# Patient Record
Sex: FEMALE | Race: WHITE | NOT HISPANIC OR LATINO | URBAN - METROPOLITAN AREA
[De-identification: names, ages, dates, MRNs, and addresses within clinical notes are randomized per-mention and may not be internally consistent; named-entity substitution may affect disease eponyms.]

---

## 2018-08-29 ENCOUNTER — INPATIENT (INPATIENT)
Facility: HOSPITAL | Age: 83
LOS: 7 days | Discharge: NURSING, REHAB OR LONG-TERM CARE | DRG: 871 | End: 2018-09-06
Attending: STUDENT IN AN ORGANIZED HEALTH CARE EDUCATION/TRAINING PROGRAM | Admitting: INTERNAL MEDICINE
Payer: MEDICARE

## 2018-08-29 VITALS
HEART RATE: 88 BPM | DIASTOLIC BLOOD PRESSURE: 99 MMHG | TEMPERATURE: 99 F | WEIGHT: 179.9 LBS | RESPIRATION RATE: 16 BRPM | SYSTOLIC BLOOD PRESSURE: 171 MMHG | OXYGEN SATURATION: 94 %

## 2018-08-29 DIAGNOSIS — E11.01 TYPE 2 DIABETES MELLITUS WITH HYPEROSMOLARITY WITH COMA: ICD-10-CM

## 2018-08-29 DIAGNOSIS — N39.0 URINARY TRACT INFECTION, SITE NOT SPECIFIED: ICD-10-CM

## 2018-08-29 LAB
ACETONE SERPL-MCNC: NEGATIVE — SIGNIFICANT CHANGE UP
ALBUMIN SERPL ELPH-MCNC: 3.5 G/DL — SIGNIFICANT CHANGE UP (ref 3.3–5)
ALP SERPL-CCNC: 87 U/L — SIGNIFICANT CHANGE UP (ref 40–120)
ALT FLD-CCNC: 31 U/L DA — SIGNIFICANT CHANGE UP (ref 10–45)
ANION GAP SERPL CALC-SCNC: 19 MMOL/L — HIGH (ref 5–17)
APPEARANCE UR: CLEAR — SIGNIFICANT CHANGE UP
APTT BLD: 28 SEC — SIGNIFICANT CHANGE UP (ref 27.5–37.4)
AST SERPL-CCNC: 31 U/L — SIGNIFICANT CHANGE UP (ref 10–40)
BACTERIA # UR AUTO: ABNORMAL /HPF
BILIRUB SERPL-MCNC: 1.1 MG/DL — SIGNIFICANT CHANGE UP (ref 0.2–1.2)
BILIRUB UR-MCNC: NEGATIVE — SIGNIFICANT CHANGE UP
BLD GP AB SCN SERPL QL: SIGNIFICANT CHANGE UP
BLOOD GAS COMMENTS ARTERIAL: SIGNIFICANT CHANGE UP
BLOOD GAS COMMENTS ARTERIAL: SIGNIFICANT CHANGE UP
BUN SERPL-MCNC: 31 MG/DL — HIGH (ref 7–23)
CALCIUM SERPL-MCNC: 10.7 MG/DL — HIGH (ref 8.4–10.5)
CHLORIDE SERPL-SCNC: 95 MMOL/L — LOW (ref 96–108)
CO2 BLDA-SCNC: 19 MMOL/L — LOW (ref 22–30)
CO2 SERPL-SCNC: 19 MMOL/L — LOW (ref 22–31)
COLOR SPEC: YELLOW — SIGNIFICANT CHANGE UP
CREAT SERPL-MCNC: 1.62 MG/DL — HIGH (ref 0.5–1.3)
DIFF PNL FLD: ABNORMAL
EPI CELLS # UR: SIGNIFICANT CHANGE UP
GLUCOSE BLDC GLUCOMTR-MCNC: >600 MG/DL — CRITICAL HIGH (ref 70–99)
GLUCOSE SERPL-MCNC: 1124 MG/DL — CRITICAL HIGH (ref 70–99)
GLUCOSE UR QL: 1000 MG/DL
HCT VFR BLD CALC: 44.9 % — SIGNIFICANT CHANGE UP (ref 34.5–45)
HGB BLD-MCNC: 14.4 G/DL — SIGNIFICANT CHANGE UP (ref 11.5–15.5)
HOROWITZ INDEX BLDA+IHG-RTO: SIGNIFICANT CHANGE UP
INR BLD: 1.01 RATIO — SIGNIFICANT CHANGE UP (ref 0.88–1.16)
KETONES UR-MCNC: ABNORMAL
LACTATE SERPL-SCNC: 7.4 MMOL/L — CRITICAL HIGH (ref 0.7–2)
LACTATE SERPL-SCNC: 7.8 MMOL/L — CRITICAL HIGH (ref 0.7–2)
LEUKOCYTE ESTERASE UR-ACNC: ABNORMAL
LYMPHOCYTES # BLD AUTO: 11 % — LOW (ref 13–44)
MCHC RBC-ENTMCNC: 28.5 PG — SIGNIFICANT CHANGE UP (ref 27–34)
MCHC RBC-ENTMCNC: 32.1 GM/DL — SIGNIFICANT CHANGE UP (ref 32–36)
MCV RBC AUTO: 88.8 FL — SIGNIFICANT CHANGE UP (ref 80–100)
MONOCYTES NFR BLD AUTO: 3 % — SIGNIFICANT CHANGE UP (ref 2–14)
NEUTROPHILS NFR BLD AUTO: 86 % — HIGH (ref 43–77)
NITRITE UR-MCNC: POSITIVE
PCO2 BLDA: 36 MMHG — SIGNIFICANT CHANGE UP (ref 32–46)
PH BLDA: 7.33 — LOW (ref 7.35–7.45)
PH UR: 5 — SIGNIFICANT CHANGE UP (ref 5–8)
PLAT MORPH BLD: NORMAL — SIGNIFICANT CHANGE UP
PLATELET # BLD AUTO: 247 K/UL — SIGNIFICANT CHANGE UP (ref 150–400)
PO2 BLDA: 75 MMHG — SIGNIFICANT CHANGE UP (ref 74–108)
POTASSIUM SERPL-MCNC: 5.3 MMOL/L — SIGNIFICANT CHANGE UP (ref 3.5–5.3)
POTASSIUM SERPL-SCNC: 5.3 MMOL/L — SIGNIFICANT CHANGE UP (ref 3.5–5.3)
PROT SERPL-MCNC: 8.1 G/DL — SIGNIFICANT CHANGE UP (ref 6–8.3)
PROT UR-MCNC: 500 MG/DL
PROTHROM AB SERPL-ACNC: 11.2 SEC — SIGNIFICANT CHANGE UP (ref 9.8–12.7)
RBC # BLD: 5.06 M/UL — SIGNIFICANT CHANGE UP (ref 3.8–5.2)
RBC # FLD: 14.2 % — SIGNIFICANT CHANGE UP (ref 10.3–14.5)
RBC BLD AUTO: NORMAL — SIGNIFICANT CHANGE UP
RBC CASTS # UR COMP ASSIST: >50 /HPF (ref 0–4)
SAO2 % BLDA: 93 % — SIGNIFICANT CHANGE UP (ref 92–96)
SODIUM SERPL-SCNC: 133 MMOL/L — LOW (ref 135–145)
SP GR SPEC: 1.01 — SIGNIFICANT CHANGE UP (ref 1.01–1.02)
UROBILINOGEN FLD QL: NEGATIVE — SIGNIFICANT CHANGE UP
WBC # BLD: 20 K/UL — HIGH (ref 3.8–10.5)
WBC # FLD AUTO: 20 K/UL — HIGH (ref 3.8–10.5)
WBC UR QL: SIGNIFICANT CHANGE UP /HPF (ref 0–5)

## 2018-08-29 PROCEDURE — 71045 X-RAY EXAM CHEST 1 VIEW: CPT | Mod: 26

## 2018-08-29 PROCEDURE — 93010 ELECTROCARDIOGRAM REPORT: CPT

## 2018-08-29 PROCEDURE — 99285 EMERGENCY DEPT VISIT HI MDM: CPT

## 2018-08-29 RX ORDER — MECLIZINE HCL 12.5 MG
1 TABLET ORAL
Qty: 0 | Refills: 0 | COMMUNITY

## 2018-08-29 RX ORDER — ASPIRIN/CALCIUM CARB/MAGNESIUM 324 MG
325 TABLET ORAL DAILY
Qty: 0 | Refills: 0 | Status: DISCONTINUED | OUTPATIENT
Start: 2018-08-29 | End: 2018-09-06

## 2018-08-29 RX ORDER — PANTOPRAZOLE SODIUM 20 MG/1
1 TABLET, DELAYED RELEASE ORAL
Qty: 0 | Refills: 0 | COMMUNITY

## 2018-08-29 RX ORDER — ALLOPURINOL 300 MG
300 TABLET ORAL DAILY
Qty: 0 | Refills: 0 | Status: DISCONTINUED | OUTPATIENT
Start: 2018-08-29 | End: 2018-09-06

## 2018-08-29 RX ORDER — SODIUM CHLORIDE 9 MG/ML
2400 INJECTION, SOLUTION INTRAVENOUS ONCE
Qty: 0 | Refills: 0 | Status: DISCONTINUED | OUTPATIENT
Start: 2018-08-29 | End: 2018-08-29

## 2018-08-29 RX ORDER — ATORVASTATIN CALCIUM 80 MG/1
1 TABLET, FILM COATED ORAL
Qty: 0 | Refills: 0 | COMMUNITY

## 2018-08-29 RX ORDER — CHOLESTYRAMINE 4 G/9G
0 POWDER, FOR SUSPENSION ORAL
Qty: 0 | Refills: 0 | COMMUNITY

## 2018-08-29 RX ORDER — PROPRANOLOL HCL 160 MG
40 CAPSULE, EXTENDED RELEASE 24HR ORAL THREE TIMES A DAY
Qty: 0 | Refills: 0 | Status: DISCONTINUED | OUTPATIENT
Start: 2018-08-29 | End: 2018-09-06

## 2018-08-29 RX ORDER — ATORVASTATIN CALCIUM 80 MG/1
40 TABLET, FILM COATED ORAL AT BEDTIME
Qty: 0 | Refills: 0 | Status: DISCONTINUED | OUTPATIENT
Start: 2018-08-29 | End: 2018-09-06

## 2018-08-29 RX ORDER — MECLIZINE HCL 12.5 MG
25 TABLET ORAL
Qty: 0 | Refills: 0 | Status: DISCONTINUED | OUTPATIENT
Start: 2018-08-29 | End: 2018-09-06

## 2018-08-29 RX ORDER — CIPROFLOXACIN LACTATE 400MG/40ML
400 VIAL (ML) INTRAVENOUS ONCE
Qty: 0 | Refills: 0 | Status: COMPLETED | OUTPATIENT
Start: 2018-08-29 | End: 2018-08-29

## 2018-08-29 RX ORDER — INSULIN HUMAN 100 [IU]/ML
4 INJECTION, SOLUTION SUBCUTANEOUS
Qty: 100 | Refills: 0 | Status: DISCONTINUED | OUTPATIENT
Start: 2018-08-29 | End: 2018-08-30

## 2018-08-29 RX ORDER — ACETAMINOPHEN 500 MG
650 TABLET ORAL ONCE
Qty: 0 | Refills: 0 | Status: COMPLETED | OUTPATIENT
Start: 2018-08-29 | End: 2018-08-29

## 2018-08-29 RX ORDER — INSULIN HUMAN 100 [IU]/ML
10 INJECTION, SOLUTION SUBCUTANEOUS ONCE
Qty: 0 | Refills: 0 | Status: COMPLETED | OUTPATIENT
Start: 2018-08-29 | End: 2018-08-29

## 2018-08-29 RX ORDER — ESCITALOPRAM OXALATE 10 MG/1
1 TABLET, FILM COATED ORAL
Qty: 0 | Refills: 0 | COMMUNITY

## 2018-08-29 RX ORDER — ALLOPURINOL 300 MG
1 TABLET ORAL
Qty: 0 | Refills: 0 | COMMUNITY

## 2018-08-29 RX ORDER — GABAPENTIN 400 MG/1
2 CAPSULE ORAL
Qty: 0 | Refills: 0 | COMMUNITY

## 2018-08-29 RX ORDER — SODIUM CHLORIDE 9 MG/ML
2400 INJECTION INTRAMUSCULAR; INTRAVENOUS; SUBCUTANEOUS ONCE
Qty: 0 | Refills: 0 | Status: COMPLETED | OUTPATIENT
Start: 2018-08-29 | End: 2018-08-29

## 2018-08-29 RX ORDER — CEFTRIAXONE 500 MG/1
1 INJECTION, POWDER, FOR SOLUTION INTRAMUSCULAR; INTRAVENOUS ONCE
Qty: 0 | Refills: 0 | Status: COMPLETED | OUTPATIENT
Start: 2018-08-29 | End: 2018-08-29

## 2018-08-29 RX ORDER — SODIUM CHLORIDE 9 MG/ML
1000 INJECTION INTRAMUSCULAR; INTRAVENOUS; SUBCUTANEOUS
Qty: 0 | Refills: 0 | Status: DISCONTINUED | OUTPATIENT
Start: 2018-08-29 | End: 2018-08-31

## 2018-08-29 RX ORDER — ESCITALOPRAM OXALATE 10 MG/1
10 TABLET, FILM COATED ORAL DAILY
Qty: 0 | Refills: 0 | Status: DISCONTINUED | OUTPATIENT
Start: 2018-08-29 | End: 2018-09-06

## 2018-08-29 RX ORDER — PROPRANOLOL HCL 160 MG
1 CAPSULE, EXTENDED RELEASE 24HR ORAL
Qty: 0 | Refills: 0 | COMMUNITY

## 2018-08-29 RX ORDER — PANTOPRAZOLE SODIUM 20 MG/1
40 TABLET, DELAYED RELEASE ORAL
Qty: 0 | Refills: 0 | Status: DISCONTINUED | OUTPATIENT
Start: 2018-08-29 | End: 2018-09-06

## 2018-08-29 RX ORDER — CEFTRIAXONE 500 MG/1
1 INJECTION, POWDER, FOR SOLUTION INTRAMUSCULAR; INTRAVENOUS EVERY 24 HOURS
Qty: 0 | Refills: 0 | Status: COMPLETED | OUTPATIENT
Start: 2018-08-29 | End: 2018-09-02

## 2018-08-29 RX ORDER — GABAPENTIN 400 MG/1
100 CAPSULE ORAL
Qty: 0 | Refills: 0 | Status: DISCONTINUED | OUTPATIENT
Start: 2018-08-29 | End: 2018-09-06

## 2018-08-29 RX ORDER — INSULIN HUMAN 100 [IU]/ML
8 INJECTION, SOLUTION SUBCUTANEOUS ONCE
Qty: 0 | Refills: 0 | Status: COMPLETED | OUTPATIENT
Start: 2018-08-29 | End: 2018-08-29

## 2018-08-29 RX ADMIN — CEFTRIAXONE 100 GRAM(S): 500 INJECTION, POWDER, FOR SOLUTION INTRAMUSCULAR; INTRAVENOUS at 19:15

## 2018-08-29 RX ADMIN — SODIUM CHLORIDE 100 MILLILITER(S): 9 INJECTION INTRAMUSCULAR; INTRAVENOUS; SUBCUTANEOUS at 22:29

## 2018-08-29 RX ADMIN — INSULIN HUMAN 8 UNIT(S): 100 INJECTION, SOLUTION SUBCUTANEOUS at 19:17

## 2018-08-29 RX ADMIN — SODIUM CHLORIDE 2400 MILLILITER(S): 9 INJECTION INTRAMUSCULAR; INTRAVENOUS; SUBCUTANEOUS at 20:15

## 2018-08-29 RX ADMIN — INSULIN HUMAN 10 UNIT(S): 100 INJECTION, SOLUTION SUBCUTANEOUS at 20:04

## 2018-08-29 RX ADMIN — CEFTRIAXONE 1 GRAM(S): 500 INJECTION, POWDER, FOR SOLUTION INTRAMUSCULAR; INTRAVENOUS at 19:45

## 2018-08-29 RX ADMIN — Medication 400 MILLIGRAM(S): at 21:41

## 2018-08-29 RX ADMIN — INSULIN HUMAN 8 UNIT(S)/HR: 100 INJECTION, SOLUTION SUBCUTANEOUS at 20:42

## 2018-08-29 RX ADMIN — Medication 200 MILLIGRAM(S): at 20:41

## 2018-08-29 RX ADMIN — Medication 650 MILLIGRAM(S): at 19:22

## 2018-08-29 RX ADMIN — SODIUM CHLORIDE 2400 MILLILITER(S): 9 INJECTION INTRAMUSCULAR; INTRAVENOUS; SUBCUTANEOUS at 19:15

## 2018-08-29 NOTE — H&P ADULT - PMH
Diabetes    GERD (gastroesophageal reflux disease)    H/O: gout    HTN (hypertension)    Hyperlipemia    Major depressive disorder    Osteoarthritis    Vertigo

## 2018-08-29 NOTE — H&P ADULT - HISTORY OF PRESENT ILLNESS
88 year old female 88 year old female who presents with increased confusion and hyperglycemia. On evaluation in the ER she was found to have serum glucose of 1124 with lactate of 7.8 and UA shows UTI. She is awake alert responsive to voice and answerers questions but confused in conversation,  per daughter at bedside has baseline confusion but tonight seems increased. She has hemodynamically stable vital signs no report of fever chills N/V/D or urinary difficulties. There is report of questionable blood in patients depends but here the hemoglobin is stable and no signs of bleeding.

## 2018-08-29 NOTE — ED PROVIDER NOTE - ATTENDING CONTRIBUTION TO CARE
Dr. Spann: I performed a face to face bedside interview with patient regarding history of present illness, review of symptoms and past medical history. I completed an independent physical exam.  I have discussed patient's plan of care with PA.   I agree with note as stated above, having amended the EMR as needed to reflect my findings.   This includes HISTORY OF PRESENT ILLNESS, HIV, PAST MEDICAL/SURGICAL/FAMILY/SOCIAL HISTORY, ALLERGIES AND HOME MEDICATIONS, REVIEW OF SYSTEMS, PHYSICAL EXAM, and any PROGRESS NOTES during the time I functioned as the attending physician for this patient.    dr spann:   87 yo female with hyperglycemia, ams, rectal bleeding, will check bloodwork, hydration, urine, xray, correct electrolyte abnormaities Dr. Spann: I performed a face to face bedside interview with patient regarding history of present illness, review of symptoms and past medical history. I completed an independent physical exam.  I have discussed patient's plan of care with PA.   I agree with note as stated above, having amended the EMR as needed to reflect my findings.   This includes HISTORY OF PRESENT ILLNESS, HIV, PAST MEDICAL/SURGICAL/FAMILY/SOCIAL HISTORY, ALLERGIES AND HOME MEDICATIONS, REVIEW OF SYSTEMS, PHYSICAL EXAM, and any PROGRESS NOTES during the time I functioned as the attending physician for this patient.    dr spann:   87 yo female with hyperglycemia, ams, rectal bleeding, will check bloodwork, hydration, urine, xray, correct electrolyte abnormaities; admission

## 2018-08-29 NOTE — H&P ADULT - PROBLEM SELECTOR PLAN 3
likely secondary to sepsis from UTI vs hyperosmolar state   Hold nephrotoxic meds  Continue aggressive hydration  Trend urine output  Follow up BUN/Creatinine  follow up electrolytes

## 2018-08-29 NOTE — ED ADULT NURSE REASSESSMENT NOTE - NS ED NURSE REASSESS COMMENT FT1
Received pt in Bed 10 from KENISHA Schroeder. Pt is lethargic but responds to verbal stimuli. Maintained pt on cardiac monitor. Pending lab test results. Family members at bedside

## 2018-08-29 NOTE — ED PROVIDER NOTE - OBJECTIVE STATEMENT
Pt from sunrise, found to be confused and hyperglycemic, diabetic. Also as per ems blood in diaper. Pt wit mild confusion at baseline but as per daughter pt is lethargic and confused now. no fever. no chills.

## 2018-08-29 NOTE — ED ADULT NURSE NOTE - PMH
Diabetes    H/O: gout    HTN (hypertension)    Vertigo Diabetes    GERD (gastroesophageal reflux disease)    H/O: gout    HTN (hypertension)    Hyperlipemia    Major depressive disorder    Osteoarthritis    Vertigo

## 2018-08-29 NOTE — H&P ADULT - NSHPLABSRESULTS_GEN_ALL_CORE
08-29    133<L>  |  95<L>  |  31<H>  ----------------------------<  1124<HH>  5.3   |  19<L>  |  1.62<H>    Ca    10.7<H>      29 Aug 2018 19:00  TPro  8.1  /  Alb  3.5  /  TBili  1.1  /  DBili  x   /  AST  31  /  ALT  31  /  AlkPhos  87  08-29                        14.4   20.0  )-----------( 247      ( 29 Aug 2018 19:00 )             44.9     PT/INR - ( 29 Aug 2018 20:35 )   PT: 11.2 sec;   INR: 1.01 ratio    PTT - ( 29 Aug 2018 20:35 )  PTT:28.0 sec    LIVER FUNCTIONS - ( 29 Aug 2018 19:00 )  Alb: 3.5 g/dL / Pro: 8.1 g/dL / ALK PHOS: 87 U/L / ALT: 31 U/L DA / AST: 31 U/L / GGT: x           CAPILLARY BLOOD GLUCOSE  POCT Blood Glucose.: >600 mg/dL (29 Aug 2018 21:21)  POCT Blood Glucose.: >600 mg/dL (29 Aug 2018 20:54)  POCT Blood Glucose.: >600 mg/dL (29 Aug 2018 20:35)  POCT Blood Glucose.: >600 mg/dL (29 Aug 2018 19:49)  POCT Blood Glucose.: >600 mg/dL (29 Aug 2018 18:46)  POCT Blood Glucose.: >600 mg/dL (29 Aug 2018 18:43)

## 2018-08-29 NOTE — ED PROVIDER NOTE - PHYSICAL EXAMINATION
Gen:  lethargic, no acute distress  Head:  atraumatic, normocephalic  HEENT: PERRLA, EOMI, normal nose, normal oropharynx  CV:  rrr, nl S1, S2, no m/r/g  Pulm:  lungs CTA b/l  Abd: s/nt/nd, +BS; slightly reddish guaiac positive stool  MSK:  moving all extremities, no back midline ttp, no stepoffs, no cva TTP  Neuro:  confused, no focal deficits  Skin:  clear, dry, intact  Psych: awake, lethargic, confused, normal affect, no apparent risk to self or others

## 2018-08-29 NOTE — H&P ADULT - NSHPPHYSICALEXAM_GEN_ALL_CORE
Physicial Exam:     Constitutional: NAD, well-groomed, well-developed  HEENT: PERRLA, EOMI, no drainage or redness, airway patent   Neck: No bruits; no thyromegaly or nodules,  No JVD  Back: Normal spine flexure, No CVA tenderness, No deformity or limitation of movement  Respiratory: Breath Sounds equal & clear to percussion & auscultation, no accessory muscle use  Cardiovascular: Regular rate & rhythm, normal S1, S2; no murmurs, gallops or rubs; no S3, S4  Gastrointestinal: Soft, non-tender, non distended no hepatosplenomegaly, normal bowel sounds  Extremities: No peripheral edema, No cyanosis, clubbing   Vascular: Equal and normal pulses: 2+ peripheral pulses throughout  Neurological: Alert and oriented to person,place and time, CN2-12 intact, PERRLA, EOMI,  Motor Strength 5/5 B/L upper and lower extremities; DTRs 2+ intact and symmetric, normal sensory exam  Psychiatric: Normal mood, normal affect  Musculoskeletal: No joint pain, swelling or deformity; no limitation of movement  Skin: No rashes, Warm and well perfused Physicial Exam:     Constitutional: NAD, well-groomed, well-developed  HEENT: PERRLA, EOMI, no drainage or redness, airway patent   Neck: No bruits; no thyromegaly or nodules,  No JVD  Back: Normal spine flexure, No CVA tenderness, No deformity or limitation of movement  Respiratory: Breath Sounds equal & clear to percussion & auscultation, no accessory muscle use  Cardiovascular: Regular rate & rhythm, normal S1, S2; no murmurs, gallops or rubs; no S3, S4  Gastrointestinal: Soft, non-tender, non distended no hepatosplenomegaly, normal bowel sounds  Extremities: No peripheral edema, No cyanosis, clubbing   Vascular: Equal and normal pulses: 2+ peripheral pulses throughout  Neurological: Alert and oriented to person but confused to time and place , CN2-12 intact, PERRLA, EOMI,  Motor Strength 5/5 B/L upper and lower extremities; DTRs 2+ intact and symmetric, normal sensory exam  Musculoskeletal: No joint pain, swelling or deformity; no limitation of movement  Skin: No rashes, Warm and well perfused

## 2018-08-29 NOTE — ED PROVIDER NOTE - CARE PLAN
Principal Discharge DX:	Hyperosmolar hyperglycemic coma due to diabetes mellitus without ketoacidosis  Secondary Diagnosis:	Urinary tract infection without hematuria, site unspecified

## 2018-08-29 NOTE — H&P ADULT - PROBLEM SELECTOR PLAN 2
continue antibiotics ( Ceftriaxaone)   follow up urine culture results   continue IV fluid hydration  goal urine out put > 0.5cc/hr  monitor for sever sepsis including signs of shock

## 2018-08-29 NOTE — ED ADULT NURSE REASSESSMENT NOTE - NS ED NURSE REASSESS COMMENT FT1
Insulin drip infusion started via pump at 8 ml/h (8 units/hour) via the right hand IV. Ciprofloxacin 400 mg IV started via pump via the left antecubital IV. Gown and sheet changed. IV fluids infusing. Pending disposition

## 2018-08-29 NOTE — ED PROVIDER NOTE - PROGRESS NOTE DETAILS
sign out to dr ruvalcaba. f/u labs imaging, admit Matheus: Patient with HONK. D/W Dr Yury SALDAÑA; plan for admission. +UTI/Urosepsis.

## 2018-08-29 NOTE — H&P ADULT - PROBLEM SELECTOR PLAN 1
-admit to MICU  -On insulin drip, titrate per MICU protocol  - Q1H accuchecks  -aggressive fluid hydration, goal UOP >0.5 cc/kg/hr  -Q4-6H BMP, for electrolyte including magnesium, phosphorous level   -when blood sugar <180 stop insulin infusion, obtain serum glucose with accuhecks and restart home dose metabolic antidiabetic agents ( glyburide / metformin)   restart diet  -Diabetic education and counseling  -send HbA1C

## 2018-08-29 NOTE — ED PROVIDER NOTE - MEDICAL DECISION MAKING DETAILS
89 yo female with hyperglycemia, ams, rectal bleeding, will check bloodwork, hydration, urine, xray, correct electrolyte abnormaities

## 2018-08-29 NOTE — ED ADULT NURSE REASSESSMENT NOTE - NS ED NURSE REASSESS COMMENT FT1
To ER via EMS , patient with daughter at side.  Finger stick high reading, labs drawn and IVF given , Rectal Tylenol also administered, As per patients daughter she has had worsening confusion since yesterday

## 2018-08-29 NOTE — ED ADULT NURSE NOTE - SKIN INTEGRITY
Skin tear noted to the left elbow. Redness noted to the buttocks, swelling noted to bilateral lower extremities

## 2018-08-29 NOTE — H&P ADULT - ASSESSMENT
88 year old female with 88 year old female with hyperosmolar nonketotic hyperglycemia requiring insulin infusion , DONNA and sepsis secondary to UTI    Critical Care time: 37  mins assessing presenting problems of acute illness that poses high probability of life threatening deterioration or end organ damage/dysfunction.  Medical decision making inculding Initiating plan of care, reviewing data, reviewing radiology,direct patient bedside evaluation and interpretation of vital signs, any necessary ventilator management , discusion with multidisciplinary team, discussing goals of care with patient/family, all non inclusive of procedures

## 2018-08-29 NOTE — ED ADULT NURSE NOTE - NSIMPLEMENTINTERV_GEN_ALL_ED
Implemented All Fall with Harm Risk Interventions:  [place] to call system. Call bell, personal items and telephone within reach. Instruct patient to call for assistance. Room bathroom lighting operational. Non-slip footwear when patient is off stretcher. Physically safe environment: no spills, clutter or unnecessary equipment. Stretcher in lowest position, wheels locked, appropriate side rails in place. Provide visual cue, wrist band, yellow gown, etc. Monitor gait and stability. Monitor for mental status changes and reorient to person, place, and time. Review medications for side effects contributing to fall risk. Reinforce activity limits and safety measures with patient and family. Provide visual clues: red socks.

## 2018-08-30 DIAGNOSIS — N17.9 ACUTE KIDNEY FAILURE, UNSPECIFIED: ICD-10-CM

## 2018-08-30 LAB
ANION GAP SERPL CALC-SCNC: 12 MMOL/L — SIGNIFICANT CHANGE UP (ref 5–17)
ANION GAP SERPL CALC-SCNC: 14 MMOL/L — SIGNIFICANT CHANGE UP (ref 5–17)
BUN SERPL-MCNC: 27 MG/DL — HIGH (ref 7–23)
BUN SERPL-MCNC: 28 MG/DL — HIGH (ref 7–23)
CALCIUM SERPL-MCNC: 10 MG/DL — SIGNIFICANT CHANGE UP (ref 8.4–10.5)
CALCIUM SERPL-MCNC: 9.9 MG/DL — SIGNIFICANT CHANGE UP (ref 8.4–10.5)
CHLORIDE SERPL-SCNC: 103 MMOL/L — SIGNIFICANT CHANGE UP (ref 96–108)
CHLORIDE SERPL-SCNC: 105 MMOL/L — SIGNIFICANT CHANGE UP (ref 96–108)
CO2 SERPL-SCNC: 21 MMOL/L — LOW (ref 22–31)
CO2 SERPL-SCNC: 24 MMOL/L — SIGNIFICANT CHANGE UP (ref 22–31)
CREAT SERPL-MCNC: 1.12 MG/DL — SIGNIFICANT CHANGE UP (ref 0.5–1.3)
CREAT SERPL-MCNC: 1.39 MG/DL — HIGH (ref 0.5–1.3)
GLUCOSE BLDC GLUCOMTR-MCNC: 107 MG/DL — HIGH (ref 70–99)
GLUCOSE BLDC GLUCOMTR-MCNC: 122 MG/DL — HIGH (ref 70–99)
GLUCOSE BLDC GLUCOMTR-MCNC: 127 MG/DL — HIGH (ref 70–99)
GLUCOSE BLDC GLUCOMTR-MCNC: 135 MG/DL — HIGH (ref 70–99)
GLUCOSE BLDC GLUCOMTR-MCNC: 153 MG/DL — HIGH (ref 70–99)
GLUCOSE BLDC GLUCOMTR-MCNC: 171 MG/DL — HIGH (ref 70–99)
GLUCOSE BLDC GLUCOMTR-MCNC: 173 MG/DL — HIGH (ref 70–99)
GLUCOSE BLDC GLUCOMTR-MCNC: 177 MG/DL — HIGH (ref 70–99)
GLUCOSE BLDC GLUCOMTR-MCNC: 211 MG/DL — HIGH (ref 70–99)
GLUCOSE BLDC GLUCOMTR-MCNC: 230 MG/DL — HIGH (ref 70–99)
GLUCOSE BLDC GLUCOMTR-MCNC: 257 MG/DL — HIGH (ref 70–99)
GLUCOSE BLDC GLUCOMTR-MCNC: 286 MG/DL — HIGH (ref 70–99)
GLUCOSE BLDC GLUCOMTR-MCNC: 331 MG/DL — HIGH (ref 70–99)
GLUCOSE BLDC GLUCOMTR-MCNC: 340 MG/DL — HIGH (ref 70–99)
GLUCOSE BLDC GLUCOMTR-MCNC: 380 MG/DL — HIGH (ref 70–99)
GLUCOSE BLDC GLUCOMTR-MCNC: 453 MG/DL — CRITICAL HIGH (ref 70–99)
GLUCOSE BLDC GLUCOMTR-MCNC: 486 MG/DL — CRITICAL HIGH (ref 70–99)
GLUCOSE BLDC GLUCOMTR-MCNC: 521 MG/DL — CRITICAL HIGH (ref 70–99)
GLUCOSE BLDC GLUCOMTR-MCNC: >600 MG/DL — CRITICAL HIGH (ref 70–99)
GLUCOSE SERPL-MCNC: 394 MG/DL — HIGH (ref 70–99)
GLUCOSE SERPL-MCNC: 681 MG/DL — CRITICAL HIGH (ref 70–99)
HCT VFR BLD CALC: 37 % — SIGNIFICANT CHANGE UP (ref 34.5–45)
HGB BLD-MCNC: 12.1 G/DL — SIGNIFICANT CHANGE UP (ref 11.5–15.5)
INR BLD: 1.11 RATIO — SIGNIFICANT CHANGE UP (ref 0.88–1.16)
LACTATE SERPL-SCNC: 4 MMOL/L — CRITICAL HIGH (ref 0.7–2)
MAGNESIUM SERPL-MCNC: 1.2 MG/DL — LOW (ref 1.6–2.6)
MCHC RBC-ENTMCNC: 28.1 PG — SIGNIFICANT CHANGE UP (ref 27–34)
MCHC RBC-ENTMCNC: 32.7 GM/DL — SIGNIFICANT CHANGE UP (ref 32–36)
MCV RBC AUTO: 86 FL — SIGNIFICANT CHANGE UP (ref 80–100)
PLATELET # BLD AUTO: 203 K/UL — SIGNIFICANT CHANGE UP (ref 150–400)
POTASSIUM SERPL-MCNC: 3.5 MMOL/L — SIGNIFICANT CHANGE UP (ref 3.5–5.3)
POTASSIUM SERPL-MCNC: 3.6 MMOL/L — SIGNIFICANT CHANGE UP (ref 3.5–5.3)
POTASSIUM SERPL-SCNC: 3.5 MMOL/L — SIGNIFICANT CHANGE UP (ref 3.5–5.3)
POTASSIUM SERPL-SCNC: 3.6 MMOL/L — SIGNIFICANT CHANGE UP (ref 3.5–5.3)
PROTHROM AB SERPL-ACNC: 12.4 SEC — SIGNIFICANT CHANGE UP (ref 9.8–12.7)
RBC # BLD: 4.3 M/UL — SIGNIFICANT CHANGE UP (ref 3.8–5.2)
RBC # FLD: 13.7 % — SIGNIFICANT CHANGE UP (ref 10.3–14.5)
SODIUM SERPL-SCNC: 138 MMOL/L — SIGNIFICANT CHANGE UP (ref 135–145)
SODIUM SERPL-SCNC: 141 MMOL/L — SIGNIFICANT CHANGE UP (ref 135–145)
WBC # BLD: 22.7 K/UL — HIGH (ref 3.8–10.5)
WBC # FLD AUTO: 22.7 K/UL — HIGH (ref 3.8–10.5)

## 2018-08-30 PROCEDURE — 70450 CT HEAD/BRAIN W/O DYE: CPT | Mod: 26

## 2018-08-30 RX ORDER — SODIUM CHLORIDE 9 MG/ML
1000 INJECTION, SOLUTION INTRAVENOUS
Qty: 0 | Refills: 0 | Status: DISCONTINUED | OUTPATIENT
Start: 2018-08-30 | End: 2018-09-06

## 2018-08-30 RX ORDER — DEXTROSE 50 % IN WATER 50 %
12.5 SYRINGE (ML) INTRAVENOUS ONCE
Qty: 0 | Refills: 0 | Status: DISCONTINUED | OUTPATIENT
Start: 2018-08-30 | End: 2018-09-06

## 2018-08-30 RX ORDER — DEXTROSE 50 % IN WATER 50 %
15 SYRINGE (ML) INTRAVENOUS ONCE
Qty: 0 | Refills: 0 | Status: DISCONTINUED | OUTPATIENT
Start: 2018-08-30 | End: 2018-09-06

## 2018-08-30 RX ORDER — INSULIN GLARGINE 100 [IU]/ML
5 INJECTION, SOLUTION SUBCUTANEOUS ONCE
Qty: 0 | Refills: 0 | Status: COMPLETED | OUTPATIENT
Start: 2018-08-30 | End: 2018-08-30

## 2018-08-30 RX ORDER — SODIUM CHLORIDE 9 MG/ML
1000 INJECTION INTRAMUSCULAR; INTRAVENOUS; SUBCUTANEOUS
Qty: 0 | Refills: 0 | Status: DISCONTINUED | OUTPATIENT
Start: 2018-08-30 | End: 2018-08-30

## 2018-08-30 RX ORDER — INSULIN HUMAN 100 [IU]/ML
4 INJECTION, SOLUTION SUBCUTANEOUS
Qty: 100 | Refills: 0 | Status: DISCONTINUED | OUTPATIENT
Start: 2018-08-30 | End: 2018-08-31

## 2018-08-30 RX ORDER — DEXTROSE 50 % IN WATER 50 %
25 SYRINGE (ML) INTRAVENOUS ONCE
Qty: 0 | Refills: 0 | Status: DISCONTINUED | OUTPATIENT
Start: 2018-08-30 | End: 2018-09-06

## 2018-08-30 RX ORDER — HEPARIN SODIUM 5000 [USP'U]/ML
5000 INJECTION INTRAVENOUS; SUBCUTANEOUS EVERY 8 HOURS
Qty: 0 | Refills: 0 | Status: DISCONTINUED | OUTPATIENT
Start: 2018-08-30 | End: 2018-09-06

## 2018-08-30 RX ORDER — GLUCAGON INJECTION, SOLUTION 0.5 MG/.1ML
1 INJECTION, SOLUTION SUBCUTANEOUS ONCE
Qty: 0 | Refills: 0 | Status: DISCONTINUED | OUTPATIENT
Start: 2018-08-30 | End: 2018-09-06

## 2018-08-30 RX ORDER — SODIUM CHLORIDE 9 MG/ML
1000 INJECTION INTRAMUSCULAR; INTRAVENOUS; SUBCUTANEOUS
Qty: 0 | Refills: 0 | Status: DISCONTINUED | OUTPATIENT
Start: 2018-08-30 | End: 2018-08-31

## 2018-08-30 RX ORDER — INSULIN GLARGINE 100 [IU]/ML
5 INJECTION, SOLUTION SUBCUTANEOUS AT BEDTIME
Qty: 0 | Refills: 0 | Status: DISCONTINUED | OUTPATIENT
Start: 2018-08-31 | End: 2018-09-02

## 2018-08-30 RX ORDER — INSULIN LISPRO 100/ML
VIAL (ML) SUBCUTANEOUS
Qty: 0 | Refills: 0 | Status: DISCONTINUED | OUTPATIENT
Start: 2018-08-30 | End: 2018-09-06

## 2018-08-30 RX ORDER — INSULIN LISPRO 100/ML
VIAL (ML) SUBCUTANEOUS AT BEDTIME
Qty: 0 | Refills: 0 | Status: DISCONTINUED | OUTPATIENT
Start: 2018-08-30 | End: 2018-09-06

## 2018-08-30 RX ADMIN — Medication 40 MILLIGRAM(S): at 13:03

## 2018-08-30 RX ADMIN — INSULIN GLARGINE 5 UNIT(S): 100 INJECTION, SOLUTION SUBCUTANEOUS at 16:19

## 2018-08-30 RX ADMIN — HEPARIN SODIUM 5000 UNIT(S): 5000 INJECTION INTRAVENOUS; SUBCUTANEOUS at 14:01

## 2018-08-30 RX ADMIN — Medication 40 MILLIGRAM(S): at 21:15

## 2018-08-30 RX ADMIN — Medication 40 MILLIGRAM(S): at 06:08

## 2018-08-30 RX ADMIN — Medication 300 MILLIGRAM(S): at 13:02

## 2018-08-30 RX ADMIN — ATORVASTATIN CALCIUM 40 MILLIGRAM(S): 80 TABLET, FILM COATED ORAL at 21:15

## 2018-08-30 RX ADMIN — CEFTRIAXONE 100 GRAM(S): 500 INJECTION, POWDER, FOR SOLUTION INTRAMUSCULAR; INTRAVENOUS at 06:08

## 2018-08-30 RX ADMIN — PANTOPRAZOLE SODIUM 40 MILLIGRAM(S): 20 TABLET, DELAYED RELEASE ORAL at 06:08

## 2018-08-30 RX ADMIN — GABAPENTIN 100 MILLIGRAM(S): 400 CAPSULE ORAL at 06:08

## 2018-08-30 RX ADMIN — ESCITALOPRAM OXALATE 10 MILLIGRAM(S): 10 TABLET, FILM COATED ORAL at 13:01

## 2018-08-30 RX ADMIN — HEPARIN SODIUM 5000 UNIT(S): 5000 INJECTION INTRAVENOUS; SUBCUTANEOUS at 21:15

## 2018-08-30 RX ADMIN — Medication 325 MILLIGRAM(S): at 13:05

## 2018-08-30 NOTE — PROGRESS NOTE ADULT - SUBJECTIVE AND OBJECTIVE BOX
ICU CONSULT = addendum to H&P  Location of Patient : [facility] [id number] ( [facility])  Attending requesting Consult:Jimmy Saavedra  Chief Complaint : AMS  Reason For consult : Hyperglycemia      Initial HPI on admission:  HPI:  88 year old female with PMH of ? Dementia, HTN, Hgih chol, vertigo, DM2 on metformin who who presents with increased confusion and hyperglycemia. On evaluation in the ER she was found to have serum glucose of 1124 with lactate of 7.8 and UA shows UTI. She is awake alert responsive to voice and answerers questions but confused in conversation.    BRIEF HOSPITAL COURSE:   since admission patient been on insulin drip , IVF, IV abx  pt feeling better but is confused and unable to provide history at this time    PAST MEDICAL & SURGICAL HISTORY:  Hyperlipemia  Major depressive disorder  GERD (gastroesophageal reflux disease)  Osteoarthritis  Vertigo  H/O: gout  HTN (hypertension)  Diabetes  No significant past surgical history    Allergies    penicillin (Unknown)  predniSONE (Unknown)      FAMILY HISTORY/Social History/Family history - unable to obtain due to current mental status  MEDICATIONS  (STANDING):  allopurinol 300 milliGRAM(s) Oral daily  aspirin 325 milliGRAM(s) Oral daily  atorvastatin 40 milliGRAM(s) Oral at bedtime  cefTRIAXone   IVPB 1 Gram(s) IV Intermittent every 24 hours  escitalopram 10 milliGRAM(s) Oral daily  gabapentin 100 milliGRAM(s) Oral two times a day  heparin  Injectable 5000 Unit(s) SubCutaneous every 8 hours  insulin Infusion 4 Unit(s)/Hr (4 mL/Hr) IV Continuous <Continuous>  pantoprazole    Tablet 40 milliGRAM(s) Oral before breakfast  propranolol 40 milliGRAM(s) Oral three times a day  sodium chloride 0.9%. 1000 milliLiter(s) (100 mL/Hr) IV Continuous <Continuous>  sodium chloride 0.9%. 1000 milliLiter(s) (1000 mL/Hr) IV Continuous <Continuous>    MEDICATIONS  (PRN):  meclizine 25 milliGRAM(s) Oral two times a day PRN Dizziness      Home Medications:  Last Order Reconciliation Date: 18 @ 21:47 (Admission Reconciliation)  allopurinol 300 mg oral tablet: 1 tab(s) orally once a day (18 @ 21:12)  aspirin 325 mg oral tablet: 1 tab(s) orally once a day (18 @ 21:13)  cholestyramine 4 g/5 g oral powder for reconstitution:  (18 @ 21:13)  escitalopram 10 mg oral tablet: 1 tab(s) orally once a day (18 @ 21:14)  gabapentin 100 mg oral capsule: 2 cap(s) orally 2 times a day (18 @ 21:14)  glyBURIDE 5 mg oral tablet: orally 2 times a day (18 @ 21:15)  Lipitor 40 mg oral tablet: 1 tab(s) orally once a day (18 @ 21:15)  meclizine 25 mg oral tablet: 1 tab(s) orally 2 times a day, As Needed (18 @ 21:15)  metFORMIN 500 mg oral tablet, extended release: 2 tab(s) orally once a day (18 @ 21:19)  propranolol 40 mg oral tablet: 1 tab(s) orally 3 times a day (18 @ 21:18)  Protonix 40 mg oral delayed release tablet: 1 tab(s) orally once a day (18 @ 21:17)  raNITIdine 150 mg oral tablet: 1 tab(s) orally 2 times a day (18 @ 21:17)  valsartan-hydrochlorothiazide 320mg-12.5mg oral tablet: 1 tab(s) orally once a day (18 @ 21:17)      LABS:                        12.1   22.7  )-----------( 203      ( 30 Aug 2018 05:20 )             37.0         141  |  105  |  27<H>  ----------------------------<  394<H>  3.6   |  24  |  1.12    Ca    9.9      30 Aug 2018 05:20  Mg     1.2         TPro  8.1  /  Alb  3.5  /  TBili  1.1  /  DBili  x   /  AST  31  /  ALT  31  /  AlkPhos  87              PT/INR - ( 30 Aug 2018 05:20 )   PT: 12.4 sec;   INR: 1.11 ratio         PTT - ( 29 Aug 2018 20:35 )  PTT:28.0 sec  Urinalysis Basic - ( 29 Aug 2018 20:30 )    Color: Yellow / Appearance: Clear / S.010 / pH: x  Gluc: x / Ketone: Small  / Bili: Negative / Urobili: Negative   Blood: x / Protein: 500 mg/dL / Nitrite: Positive   Leuk Esterase: Small / RBC: >50 /HPF / WBC 3-5 /HPF   Sq Epi: x / Non Sq Epi: Neg.-Few / Bacteria: Many /HPF              CULTURES: (if applicable)        ABG - ( 29 Aug 2018 19:45 )  pH, Arterial: 7.33  pH, Blood: x     /  pCO2: 36    /  pO2: 75    / HCO3: x     / Base Excess: x     /  SaO2: 93                CAPILLARY BLOOD GLUCOSE      POCT Blood Glucose.: 230 mg/dL (30 Aug 2018 10:14)      RADIOLOGY  CXR:      CT:    ECHO:      VITALS:  T(C): 37.3 (18 @ 10:00), Max: 38.2 (18 @ 18:45)  T(F): 99.2 (18 @ 10:00), Max: 100.7 (18 @ 18:45)  HR: 73 (18 @ 10:00) (71 - 93)  BP: 98/51 (18 @ 10:00) (95/50 - 171/99)  BP(mean): 65 (18 @ 10:00) (64 - 96)  ABP: --  ABP(mean): --  RR: 17 (18 @ 10:00) (16 - 26)  SpO2: 100% (18 @ 10:00) (94% - 100%)  CVP(mm Hg): --  CVP(cm H2O): --    Ins and Outs     18 @ 07:01  -  18 @ 07:00  --------------------------------------------------------  IN: 1073 mL / OUT: 3 mL / NET: 1070 mL    18 @ 07:01  -  18 @ 10:52  --------------------------------------------------------  IN: 208.4 mL / OUT: 0 mL / NET: 208.4 mL        Height (cm): 167.64 (18 @ 22:02)  Weight (kg): 87 (18 @ 22:02)  BMI (kg/m2): 31 (18 @ 22:02)            Physical Examination:  GENERAL:               Alert, confused, No acute distress.    HEENT:                    Pupils equal, reactive to light.  Symmetric. No JVD, dryMM  PULM:                     Bilateral air entry, Clear to auscultation bilaterally, no significant sputum production, No Rales, No Rhonchi, No Wheezing  CVS:                         S1, S2,  + Murmur  ABD:                        Soft, nondistended, nontender, normoactive bowel sounds,   EXT:                         No edema, nontender, No Cyanosis or Clubbing   Vascular:                Warm Extremities, Normal Capillary refill, Normal Distal Pulses  SKIN:                       Warm and well perfused, no rashes noted.   NEURO:                  Alert, oriented, interactive, nonfocal, follows commands  PSYC:                      Calm, + Insight.

## 2018-08-30 NOTE — PROGRESS NOTE ADULT - ASSESSMENT
Assessment  Septic Shock due to UTI with metabolic Encephelopathy, DONNA and Hyperglycemia - non ketotic state due to likely UTI  possibility of lactic acidosis from metformin also possible.   ?underlying dementia, DM2, HTN    Plan  Continue Insulin drip  Transition to lantus if able  DONNA improved with IVF  continue IVF  IV abx  ID consult  f/u cultures  check CT head.  advance diet as mental status      PMD:			Pending	                   Notified(Date):  Family Updated: 	Pending	                                 Date:       Sedation & Analgesia:	n/a  Diet/Nutrition:		oral as tolerated  GI PPx:			protonix    DVT Ppx:		heparin    	RISK                                                          Points  	[ ] Previous VTE                                           	3  	[ ] Thrombophilia                                        	2  	[ ] Lower limb paralysis                              	2   	[ ] Current Cancer                                       	2   	[x ] Immobilization > 24 hrs                        	1  	[ x] ICU/CCU stay > 24 hours                       	1  	[ x] Age > 60                                                   	1  		Total:[3 ]      Activity:		               bed rest  Head of Bed:               35-45 deg  Glycemic Control:        insulin drip    Lines:  PIV    Disposition: ICU monitoring     Goals of Care: goc pending , currently full code

## 2018-08-30 NOTE — PATIENT PROFILE ADULT. - REASON FOR REFUSAL (REFER PATIENT TO HEALTHCARE PROVIDER FOR FOLLOW-UP):
Pt states they will do it in [place] every year Pt states they will follow up outpatient with PMD. Pt educated.

## 2018-08-30 NOTE — DIETITIAN INITIAL EVALUATION ADULT. - OTHER INFO
Daughter reports patient given Ensure supplements at assisted living, told her due to PMH of DM , suggested Glucerna

## 2018-08-30 NOTE — INPATIENT CERTIFICATION FOR MEDICARE PATIENTS - RISKS OF ADVERSE EVENTS
Concern for worsening infectious process/Concern for neurologic deterioration/Concern for renal deterioration/Concern for delay in diagnosis and treatment/Concern for cardiopulmonary deterioration

## 2018-08-31 LAB
ANION GAP SERPL CALC-SCNC: 10 MMOL/L — SIGNIFICANT CHANGE UP (ref 5–17)
ANION GAP SERPL CALC-SCNC: 8 MMOL/L — SIGNIFICANT CHANGE UP (ref 5–17)
BUN SERPL-MCNC: 19 MG/DL — SIGNIFICANT CHANGE UP (ref 7–23)
BUN SERPL-MCNC: 20 MG/DL — SIGNIFICANT CHANGE UP (ref 7–23)
CALCIUM SERPL-MCNC: 9.2 MG/DL — SIGNIFICANT CHANGE UP (ref 8.4–10.5)
CALCIUM SERPL-MCNC: 9.7 MG/DL — SIGNIFICANT CHANGE UP (ref 8.4–10.5)
CHLORIDE SERPL-SCNC: 107 MMOL/L — SIGNIFICANT CHANGE UP (ref 96–108)
CHLORIDE SERPL-SCNC: 111 MMOL/L — HIGH (ref 96–108)
CO2 SERPL-SCNC: 23 MMOL/L — SIGNIFICANT CHANGE UP (ref 22–31)
CO2 SERPL-SCNC: 25 MMOL/L — SIGNIFICANT CHANGE UP (ref 22–31)
CREAT SERPL-MCNC: 0.86 MG/DL — SIGNIFICANT CHANGE UP (ref 0.5–1.3)
CREAT SERPL-MCNC: 0.92 MG/DL — SIGNIFICANT CHANGE UP (ref 0.5–1.3)
CULTURE RESULTS: SIGNIFICANT CHANGE UP
GLUCOSE BLDC GLUCOMTR-MCNC: 188 MG/DL — HIGH (ref 70–99)
GLUCOSE BLDC GLUCOMTR-MCNC: 225 MG/DL — HIGH (ref 70–99)
GLUCOSE BLDC GLUCOMTR-MCNC: 227 MG/DL — HIGH (ref 70–99)
GLUCOSE BLDC GLUCOMTR-MCNC: 260 MG/DL — HIGH (ref 70–99)
GLUCOSE SERPL-MCNC: 154 MG/DL — HIGH (ref 70–99)
GLUCOSE SERPL-MCNC: 236 MG/DL — HIGH (ref 70–99)
HBA1C BLD-MCNC: 11.7 % — HIGH (ref 4–5.6)
HCT VFR BLD CALC: 32.3 % — LOW (ref 34.5–45)
HGB BLD-MCNC: 10.6 G/DL — LOW (ref 11.5–15.5)
LACTATE SERPL-SCNC: 2.5 MMOL/L — HIGH (ref 0.7–2)
MAGNESIUM SERPL-MCNC: 1 MG/DL — CRITICAL LOW (ref 1.6–2.6)
MAGNESIUM SERPL-MCNC: 1.8 MG/DL — SIGNIFICANT CHANGE UP (ref 1.6–2.6)
MCHC RBC-ENTMCNC: 27.9 PG — SIGNIFICANT CHANGE UP (ref 27–34)
MCHC RBC-ENTMCNC: 32.9 GM/DL — SIGNIFICANT CHANGE UP (ref 32–36)
MCV RBC AUTO: 84.7 FL — SIGNIFICANT CHANGE UP (ref 80–100)
PHOSPHATE SERPL-MCNC: 2.7 MG/DL — SIGNIFICANT CHANGE UP (ref 2.5–4.5)
PHOSPHATE SERPL-MCNC: 2.8 MG/DL — SIGNIFICANT CHANGE UP (ref 2.5–4.5)
PLATELET # BLD AUTO: 153 K/UL — SIGNIFICANT CHANGE UP (ref 150–400)
POTASSIUM SERPL-MCNC: 3.9 MMOL/L — SIGNIFICANT CHANGE UP (ref 3.5–5.3)
POTASSIUM SERPL-MCNC: 4.2 MMOL/L — SIGNIFICANT CHANGE UP (ref 3.5–5.3)
POTASSIUM SERPL-SCNC: 3.9 MMOL/L — SIGNIFICANT CHANGE UP (ref 3.5–5.3)
POTASSIUM SERPL-SCNC: 4.2 MMOL/L — SIGNIFICANT CHANGE UP (ref 3.5–5.3)
RBC # BLD: 3.81 M/UL — SIGNIFICANT CHANGE UP (ref 3.8–5.2)
RBC # FLD: 13.7 % — SIGNIFICANT CHANGE UP (ref 10.3–14.5)
SODIUM SERPL-SCNC: 140 MMOL/L — SIGNIFICANT CHANGE UP (ref 135–145)
SODIUM SERPL-SCNC: 144 MMOL/L — SIGNIFICANT CHANGE UP (ref 135–145)
SPECIMEN SOURCE: SIGNIFICANT CHANGE UP
WBC # BLD: 16.4 K/UL — HIGH (ref 3.8–10.5)
WBC # FLD AUTO: 16.4 K/UL — HIGH (ref 3.8–10.5)

## 2018-08-31 RX ORDER — SODIUM CHLORIDE 9 MG/ML
1000 INJECTION, SOLUTION INTRAVENOUS
Qty: 0 | Refills: 0 | Status: DISCONTINUED | OUTPATIENT
Start: 2018-08-31 | End: 2018-08-31

## 2018-08-31 RX ORDER — MAGNESIUM SULFATE 500 MG/ML
1 VIAL (ML) INJECTION
Qty: 0 | Refills: 0 | Status: COMPLETED | OUTPATIENT
Start: 2018-08-31 | End: 2018-08-31

## 2018-08-31 RX ADMIN — PANTOPRAZOLE SODIUM 40 MILLIGRAM(S): 20 TABLET, DELAYED RELEASE ORAL at 06:15

## 2018-08-31 RX ADMIN — HEPARIN SODIUM 5000 UNIT(S): 5000 INJECTION INTRAVENOUS; SUBCUTANEOUS at 14:09

## 2018-08-31 RX ADMIN — Medication 6: at 11:28

## 2018-08-31 RX ADMIN — INSULIN GLARGINE 5 UNIT(S): 100 INJECTION, SOLUTION SUBCUTANEOUS at 21:03

## 2018-08-31 RX ADMIN — HEPARIN SODIUM 5000 UNIT(S): 5000 INJECTION INTRAVENOUS; SUBCUTANEOUS at 21:03

## 2018-08-31 RX ADMIN — CEFTRIAXONE 100 GRAM(S): 500 INJECTION, POWDER, FOR SOLUTION INTRAMUSCULAR; INTRAVENOUS at 06:15

## 2018-08-31 RX ADMIN — Medication 100 GRAM(S): at 07:16

## 2018-08-31 RX ADMIN — Medication 40 MILLIGRAM(S): at 14:09

## 2018-08-31 RX ADMIN — Medication 40 MILLIGRAM(S): at 06:15

## 2018-08-31 RX ADMIN — GABAPENTIN 100 MILLIGRAM(S): 400 CAPSULE ORAL at 06:15

## 2018-08-31 RX ADMIN — SODIUM CHLORIDE 100 MILLILITER(S): 9 INJECTION, SOLUTION INTRAVENOUS at 05:15

## 2018-08-31 RX ADMIN — ESCITALOPRAM OXALATE 10 MILLIGRAM(S): 10 TABLET, FILM COATED ORAL at 11:36

## 2018-08-31 RX ADMIN — Medication 4: at 16:47

## 2018-08-31 RX ADMIN — ATORVASTATIN CALCIUM 40 MILLIGRAM(S): 80 TABLET, FILM COATED ORAL at 21:03

## 2018-08-31 RX ADMIN — Medication 100 GRAM(S): at 05:15

## 2018-08-31 RX ADMIN — SODIUM CHLORIDE 100 MILLILITER(S): 9 INJECTION INTRAMUSCULAR; INTRAVENOUS; SUBCUTANEOUS at 02:40

## 2018-08-31 RX ADMIN — Medication 2: at 07:31

## 2018-08-31 RX ADMIN — Medication 300 MILLIGRAM(S): at 11:35

## 2018-08-31 RX ADMIN — Medication 325 MILLIGRAM(S): at 11:36

## 2018-08-31 RX ADMIN — GABAPENTIN 100 MILLIGRAM(S): 400 CAPSULE ORAL at 17:38

## 2018-08-31 RX ADMIN — HEPARIN SODIUM 5000 UNIT(S): 5000 INJECTION INTRAVENOUS; SUBCUTANEOUS at 06:14

## 2018-08-31 RX ADMIN — SODIUM CHLORIDE 100 MILLILITER(S): 9 INJECTION, SOLUTION INTRAVENOUS at 17:10

## 2018-08-31 RX ADMIN — Medication 40 MILLIGRAM(S): at 21:03

## 2018-08-31 NOTE — PROGRESS NOTE ADULT - SUBJECTIVE AND OBJECTIVE BOX
Follow-up Critical Care Progress Note  Chief Complaint : Type 2 diabetes mellitus with hyperosmolar coma      patient feeling better  appears to be back on baseline.      Allergies :penicillin (Unknown)  predniSONE (Unknown)      PAST MEDICAL & SURGICAL HISTORY:  Hyperlipemia  Major depressive disorder  GERD (gastroesophageal reflux disease)  Osteoarthritis  Vertigo  H/O: gout  HTN (hypertension)  Diabetes  No significant past surgical history      Medications:  MEDICATIONS  (STANDING):  allopurinol 300 milliGRAM(s) Oral daily  aspirin 325 milliGRAM(s) Oral daily  atorvastatin 40 milliGRAM(s) Oral at bedtime  cefTRIAXone   IVPB 1 Gram(s) IV Intermittent every 24 hours  dextrose 5%. 1000 milliLiter(s) (50 mL/Hr) IV Continuous <Continuous>  dextrose 50% Injectable 12.5 Gram(s) IV Push once  dextrose 50% Injectable 25 Gram(s) IV Push once  dextrose 50% Injectable 25 Gram(s) IV Push once  escitalopram 10 milliGRAM(s) Oral daily  gabapentin 100 milliGRAM(s) Oral two times a day  heparin  Injectable 5000 Unit(s) SubCutaneous every 8 hours  insulin glargine Injectable (LANTUS) 5 Unit(s) SubCutaneous at bedtime  insulin Infusion 4 Unit(s)/Hr (4 mL/Hr) IV Continuous <Continuous>  insulin lispro (HumaLOG) corrective regimen sliding scale   SubCutaneous three times a day before meals  insulin lispro (HumaLOG) corrective regimen sliding scale   SubCutaneous at bedtime  lactated ringers. 1000 milliLiter(s) (100 mL/Hr) IV Continuous <Continuous>  pantoprazole    Tablet 40 milliGRAM(s) Oral before breakfast  propranolol 40 milliGRAM(s) Oral three times a day  sodium chloride 0.9%. 1000 milliLiter(s) (1000 mL/Hr) IV Continuous <Continuous>    MEDICATIONS  (PRN):  dextrose 40% Gel 15 Gram(s) Oral once PRN Blood Glucose LESS THAN 70 milliGRAM(s)/deciliter  glucagon  Injectable 1 milliGRAM(s) IntraMuscular once PRN Glucose LESS THAN 70 milligrams/deciliter  meclizine 25 milliGRAM(s) Oral two times a day PRN Dizziness      LABS:                        10.6   16.4  )-----------( 153      ( 31 Aug 2018 04:30 )             32.3     08-31    144  |  111<H>  |  20  ----------------------------<  154<H>  3.9   |  25  |  0.86    Ca    9.2      31 Aug 2018 04:30  Phos  2.7       Mg     1.0         TPro  8.1  /  Alb  3.5  /  TBili  1.1  /  DBili  x   /  AST  31  /  ALT  31  /  AlkPhos  87              PT/INR - ( 30 Aug 2018 05:20 )   PT: 12.4 sec;   INR: 1.11 ratio         PTT - ( 29 Aug 2018 20:35 )  PTT:28.0 sec  Urinalysis Basic - ( 29 Aug 2018 20:30 )    Color: Yellow / Appearance: Clear / S.010 / pH: x  Gluc: x / Ketone: Small  / Bili: Negative / Urobili: Negative   Blood: x / Protein: 500 mg/dL / Nitrite: Positive   Leuk Esterase: Small / RBC: >50 /HPF / WBC 3-5 /HPF   Sq Epi: x / Non Sq Epi: Neg.-Few / Bacteria: Many /HPF      Glucose Trend  18 @ 07:27   -  -- -- 188<H>  18 @ 04:30   -  -- 154<H> --  18 @ 21:14   -  -- -- 127<H>  18 @ 17:59   -  -- -- 135<H>  18 @ 17:15   -  -- -- 107<H>  18 @ 16:16   -  -- -- 122<H>  18 @ 15:05   -  -- -- 153<H>  18 @ 14:05   -  -- -- 173<H>  18 @ 13:09   -  -- -- 171<H>  18 @ 12:15   -  -- -- 177<H>    Hemoglobin A1C, Whole Blood: 11.7 % <H> (18 @ 04:30)          CULTURES: (if applicable)    Culture - Urine (collected 18 @ 20:30)  Source: .Urine Clean Catch (Midstream)  Final Report (18 @ 10:10):    Culture grew 3 or more types of organisms which indicate    collection contamination; consider recollection only if clinically    indicated.    Culture - Blood (collected 18 @ 19:00)  Source: .Blood Blood-Peripheral  Preliminary Report (18 01:):    No growth to date.    Culture - Blood (collected 18 19:00)  Source: .Blood Blood-Peripheral  Preliminary Report (18:01):    No growth to date.          ABG - ( 29 Aug 2018 19:45 )  pH, Arterial: 7.33  pH, Blood: x     /  pCO2: 36    /  pO2: 75    / HCO3: x     / Base Excess: x     /  SaO2: 93                CAPILLARY BLOOD GLUCOSE      POCT Blood Glucose.: 188 mg/dL (31 Aug 2018 07:27)      < from: CT Head No Cont (18 @ 11:56) >    Impression:  1. Unremarkable noncontrast CT scan of the brain.        < end of copied text >    VITALS:  T(C): 36.8 (18 @ 10:00), Max: 37.1 (18 @ 21:00)  T(F): 98.2 (18 @ 10:00), Max: 98.8 (18 @ 21:00)  HR: 59 (18 10:00) (56 - 666)  BP: 135/52 (18 10:00) (78/41 - 143/100)  BP(mean): 74 (18 @ 10:00) (52 - 115)  ABP: --  ABP(mean): --  RR: 9 (18 10:00) (9 - 39)  SpO2: 94% (18 10:00) (94% - 98%)  CVP(mm Hg): --  CVP(cm H2O): --    Ins and Outs     18 07:01  -  18 07:00  --------------------------------------------------------  IN: 3532.1 mL / OUT: 6 mL / NET: 3526.1 mL    18:01  -  18 @ 10:21  --------------------------------------------------------  IN: 600 mL / OUT: 0 mL / NET: 600 mL        Height (cm): 167.64 (18 @ 22:02)  Weight (kg): 87 (18 @ 22:02)  BMI (kg/m2): 31 (18 @ 22:02)

## 2018-08-31 NOTE — PROGRESS NOTE ADULT - ASSESSMENT
Assessment  Septic Shock due to UTI with metabolic Encephelopathy, DONNA and Hyperglycemia - non ketotic state due to likely UTI    shock, encephelopathy, donna, hyperglycemia now resolved     ?underlying dementia,   Underlying DM2, HTN    Plan  s/p lantus and insulin ss, a1c as above  IV abx as per ID  IV abx      PMD:			Tanner	                   Notified(Date): 8/31  Family Updated: 	Dtr	                                 Date: 8/30      Sedation & Analgesia:	n/a  Diet/Nutrition:		oral as tolerated  GI PPx:			protonix    DVT Ppx:		heparin      Activity:		               bed rest  Head of Bed:               35-45 deg  Glycemic Control:        insulin drip    Lines:  PIV    Disposition:  transfer to [facility], case d/w Dr. Edmondson who will accept pt upon transfer  case also d/w ID today. appreciate their input.    Goals of Care: goc pending , currently full code Physical Examination:  GENERAL:               Alert, oriented x 2, No acute distress.    HEENT:                    Symmetric. No JVD, Moist MM  PULM:                     Bilateral air entry, Clear to auscultation bilaterally, no significant sputum production, No Rales, No Rhonchi, No Wheezing  CVS:                         S1, S2,  + Murmur  ABD:                        Soft, nondistended, nontender, normoactive bowel sounds,   EXT:                         No edema, nontender, No Cyanosis or Clubbing   Vascular:                Warm Extremities, Normal Capillary refill, Normal Distal Pulses  SKIN:                       Warm and well perfused, no rashes noted.   NEURO:                  Alert, oriented, interactive, nonfocal, follows commands  PSYC:                      Calm, + Insight.      Assessment  Septic Shock due to UTI with metabolic Encephelopathy, DONNA and Hyperglycemia - non ketotic state due to likely UTI    shock, encephelopathy, donna, hyperglycemia now resolved     ?underlying dementia,   Underlying DM2, HTN    Plan  s/p lantus and insulin ss, a1c as above  IV abx as per ID  IV abx      PMD:			Tanner	                   Notified(Date): 8/31  Family Updated: 	Dtr	                                 Date: 8/30      Sedation & Analgesia:	n/a  Diet/Nutrition:		oral as tolerated  GI PPx:			protonix    DVT Ppx:		heparin      Activity:		               bed rest  Head of Bed:               35-45 deg  Glycemic Control:        insulin drip    Lines:  PIV    Disposition:  transfer to [facility], case d/w Dr. Edmondson who will accept pt upon transfer  case also d/w ID today. appreciate their input.    Goals of Care: goc pending , currently full code

## 2018-09-01 DIAGNOSIS — N39.0 URINARY TRACT INFECTION, SITE NOT SPECIFIED: ICD-10-CM

## 2018-09-01 DIAGNOSIS — G93.41 METABOLIC ENCEPHALOPATHY: ICD-10-CM

## 2018-09-01 DIAGNOSIS — Z29.9 ENCOUNTER FOR PROPHYLACTIC MEASURES, UNSPECIFIED: ICD-10-CM

## 2018-09-01 DIAGNOSIS — M19.90 UNSPECIFIED OSTEOARTHRITIS, UNSPECIFIED SITE: ICD-10-CM

## 2018-09-01 DIAGNOSIS — E11.9 TYPE 2 DIABETES MELLITUS WITHOUT COMPLICATIONS: ICD-10-CM

## 2018-09-01 DIAGNOSIS — E78.2 MIXED HYPERLIPIDEMIA: ICD-10-CM

## 2018-09-01 DIAGNOSIS — I10 ESSENTIAL (PRIMARY) HYPERTENSION: ICD-10-CM

## 2018-09-01 LAB
ANION GAP SERPL CALC-SCNC: 6 MMOL/L — SIGNIFICANT CHANGE UP (ref 5–17)
BASOPHILS # BLD AUTO: 0.1 K/UL — SIGNIFICANT CHANGE UP (ref 0–0.2)
BASOPHILS NFR BLD AUTO: 1.1 % — SIGNIFICANT CHANGE UP (ref 0–2)
BUN SERPL-MCNC: 17 MG/DL — SIGNIFICANT CHANGE UP (ref 7–23)
CALCIUM SERPL-MCNC: 9 MG/DL — SIGNIFICANT CHANGE UP (ref 8.4–10.5)
CHLORIDE SERPL-SCNC: 106 MMOL/L — SIGNIFICANT CHANGE UP (ref 96–108)
CO2 SERPL-SCNC: 25 MMOL/L — SIGNIFICANT CHANGE UP (ref 22–31)
CREAT SERPL-MCNC: 0.8 MG/DL — SIGNIFICANT CHANGE UP (ref 0.5–1.3)
EOSINOPHIL # BLD AUTO: 0.3 K/UL — SIGNIFICANT CHANGE UP (ref 0–0.5)
EOSINOPHIL NFR BLD AUTO: 2.9 % — SIGNIFICANT CHANGE UP (ref 0–6)
GLUCOSE BLDC GLUCOMTR-MCNC: 290 MG/DL — HIGH (ref 70–99)
GLUCOSE BLDC GLUCOMTR-MCNC: 292 MG/DL — HIGH (ref 70–99)
GLUCOSE BLDC GLUCOMTR-MCNC: 300 MG/DL — HIGH (ref 70–99)
GLUCOSE BLDC GLUCOMTR-MCNC: 344 MG/DL — HIGH (ref 70–99)
GLUCOSE SERPL-MCNC: 263 MG/DL — HIGH (ref 70–99)
HCT VFR BLD CALC: 31.7 % — LOW (ref 34.5–45)
HGB BLD-MCNC: 10.1 G/DL — LOW (ref 11.5–15.5)
LYMPHOCYTES # BLD AUTO: 3.4 K/UL — HIGH (ref 1–3.3)
LYMPHOCYTES # BLD AUTO: 30.6 % — SIGNIFICANT CHANGE UP (ref 13–44)
MCHC RBC-ENTMCNC: 27.7 PG — SIGNIFICANT CHANGE UP (ref 27–34)
MCHC RBC-ENTMCNC: 32 GM/DL — SIGNIFICANT CHANGE UP (ref 32–36)
MCV RBC AUTO: 86.5 FL — SIGNIFICANT CHANGE UP (ref 80–100)
MONOCYTES # BLD AUTO: 0.7 K/UL — SIGNIFICANT CHANGE UP (ref 0–0.9)
MONOCYTES NFR BLD AUTO: 6.3 % — SIGNIFICANT CHANGE UP (ref 1–9)
NEUTROPHILS # BLD AUTO: 6.6 K/UL — SIGNIFICANT CHANGE UP (ref 1.8–7.4)
NEUTROPHILS NFR BLD AUTO: 59.1 % — SIGNIFICANT CHANGE UP (ref 43–77)
PLATELET # BLD AUTO: 162 K/UL — SIGNIFICANT CHANGE UP (ref 150–400)
POTASSIUM SERPL-MCNC: 4.2 MMOL/L — SIGNIFICANT CHANGE UP (ref 3.5–5.3)
POTASSIUM SERPL-SCNC: 4.2 MMOL/L — SIGNIFICANT CHANGE UP (ref 3.5–5.3)
RBC # BLD: 3.66 M/UL — LOW (ref 3.8–5.2)
RBC # FLD: 13.7 % — SIGNIFICANT CHANGE UP (ref 10.3–14.5)
SODIUM SERPL-SCNC: 137 MMOL/L — SIGNIFICANT CHANGE UP (ref 135–145)
WBC # BLD: 11.1 K/UL — HIGH (ref 3.8–10.5)
WBC # FLD AUTO: 11.1 K/UL — HIGH (ref 3.8–10.5)

## 2018-09-01 PROCEDURE — 99233 SBSQ HOSP IP/OBS HIGH 50: CPT

## 2018-09-01 RX ORDER — ONDANSETRON 8 MG/1
4 TABLET, FILM COATED ORAL ONCE
Qty: 0 | Refills: 0 | Status: COMPLETED | OUTPATIENT
Start: 2018-09-01 | End: 2018-09-01

## 2018-09-01 RX ADMIN — Medication 6: at 08:08

## 2018-09-01 RX ADMIN — Medication 40 MILLIGRAM(S): at 21:46

## 2018-09-01 RX ADMIN — Medication 2: at 21:47

## 2018-09-01 RX ADMIN — INSULIN GLARGINE 5 UNIT(S): 100 INJECTION, SOLUTION SUBCUTANEOUS at 21:47

## 2018-09-01 RX ADMIN — Medication 300 MILLIGRAM(S): at 12:05

## 2018-09-01 RX ADMIN — HEPARIN SODIUM 5000 UNIT(S): 5000 INJECTION INTRAVENOUS; SUBCUTANEOUS at 13:31

## 2018-09-01 RX ADMIN — GABAPENTIN 100 MILLIGRAM(S): 400 CAPSULE ORAL at 06:35

## 2018-09-01 RX ADMIN — ATORVASTATIN CALCIUM 40 MILLIGRAM(S): 80 TABLET, FILM COATED ORAL at 21:47

## 2018-09-01 RX ADMIN — Medication 325 MILLIGRAM(S): at 12:05

## 2018-09-01 RX ADMIN — ESCITALOPRAM OXALATE 10 MILLIGRAM(S): 10 TABLET, FILM COATED ORAL at 12:05

## 2018-09-01 RX ADMIN — HEPARIN SODIUM 5000 UNIT(S): 5000 INJECTION INTRAVENOUS; SUBCUTANEOUS at 06:36

## 2018-09-01 RX ADMIN — Medication 40 MILLIGRAM(S): at 13:31

## 2018-09-01 RX ADMIN — GABAPENTIN 100 MILLIGRAM(S): 400 CAPSULE ORAL at 17:08

## 2018-09-01 RX ADMIN — Medication 40 MILLIGRAM(S): at 06:35

## 2018-09-01 RX ADMIN — ONDANSETRON 4 MILLIGRAM(S): 8 TABLET, FILM COATED ORAL at 01:29

## 2018-09-01 RX ADMIN — HEPARIN SODIUM 5000 UNIT(S): 5000 INJECTION INTRAVENOUS; SUBCUTANEOUS at 21:48

## 2018-09-01 RX ADMIN — CEFTRIAXONE 100 GRAM(S): 500 INJECTION, POWDER, FOR SOLUTION INTRAMUSCULAR; INTRAVENOUS at 06:36

## 2018-09-01 RX ADMIN — Medication 8: at 12:05

## 2018-09-01 RX ADMIN — PANTOPRAZOLE SODIUM 40 MILLIGRAM(S): 20 TABLET, DELAYED RELEASE ORAL at 06:37

## 2018-09-01 RX ADMIN — Medication 6: at 17:08

## 2018-09-01 NOTE — PROGRESS NOTE ADULT - SUBJECTIVE AND OBJECTIVE BOX
Follow-up Critical Care Progress Note  Chief Complaint : Type 2 diabetes mellitus with hyperosmolar coma    patient feels well,   no overnight issues        Allergies :penicillin (Unknown)  predniSONE (Unknown)      PAST MEDICAL & SURGICAL HISTORY:  Hyperlipemia  Major depressive disorder  GERD (gastroesophageal reflux disease)  Osteoarthritis  Vertigo  H/O: gout  HTN (hypertension)  Diabetes  No significant past surgical history      Medications:  MEDICATIONS  (STANDING):  allopurinol 300 milliGRAM(s) Oral daily  aspirin 325 milliGRAM(s) Oral daily  atorvastatin 40 milliGRAM(s) Oral at bedtime  cefTRIAXone   IVPB 1 Gram(s) IV Intermittent every 24 hours  dextrose 5%. 1000 milliLiter(s) (50 mL/Hr) IV Continuous <Continuous>  dextrose 50% Injectable 12.5 Gram(s) IV Push once  dextrose 50% Injectable 25 Gram(s) IV Push once  dextrose 50% Injectable 25 Gram(s) IV Push once  escitalopram 10 milliGRAM(s) Oral daily  gabapentin 100 milliGRAM(s) Oral two times a day  heparin  Injectable 5000 Unit(s) SubCutaneous every 8 hours  insulin glargine Injectable (LANTUS) 5 Unit(s) SubCutaneous at bedtime  insulin lispro (HumaLOG) corrective regimen sliding scale   SubCutaneous three times a day before meals  insulin lispro (HumaLOG) corrective regimen sliding scale   SubCutaneous at bedtime  pantoprazole    Tablet 40 milliGRAM(s) Oral before breakfast  propranolol 40 milliGRAM(s) Oral three times a day    MEDICATIONS  (PRN):  dextrose 40% Gel 15 Gram(s) Oral once PRN Blood Glucose LESS THAN 70 milliGRAM(s)/deciliter  glucagon  Injectable 1 milliGRAM(s) IntraMuscular once PRN Glucose LESS THAN 70 milligrams/deciliter  meclizine 25 milliGRAM(s) Oral two times a day PRN Dizziness      LABS:                        10.1   11.1  )-----------( 162      ( 01 Sep 2018 06:14 )             31.7     09-01    137  |  106  |  17  ----------------------------<  263<H>  4.2   |  25  |  0.80    Ca    9.0      01 Sep 2018 06:14  Phos  2.8     08-31  Mg     1.8     08-31        Glucose Trend  09-01-18 @ 07:45   -  -- -- 290<H>  09-01-18 @ 06:14   -  -- 263<H> --  08-31-18 @ 21:01   -  -- -- 225<H>  08-31-18 @ 16:42   -  -- -- 227<H>  08-31-18 @ 13:15   -  -- 236<H> --  08-31-18 @ 10:52   -  -- -- 260<H>  08-31-18 @ 07:27   -  -- -- 188<H>  08-31-18 @ 04:30   -  -- 154<H> --  08-30-18 @ 21:14   -  -- -- 127<H>  08-30-18 @ 17:59   -  -- -- 135<H>    Hemoglobin A1C, Whole Blood: 11.7 % <H> (08-31-18 @ 04:30)                    CULTURES: (if applicable)    Culture - Urine (collected 08-29-18 @ 20:30)  Source: .Urine Clean Catch (Midstream)  Final Report (08-31-18 @ 10:10):    Culture grew 3 or more types of organisms which indicate    collection contamination; consider recollection only if clinically    indicated.    Culture - Blood (collected 08-29-18 @ 19:00)  Source: .Blood Blood-Peripheral  Preliminary Report (08-31-18 @ 01:01):    No growth to date.    Culture - Blood (collected 08-29-18 @ 19:00)  Source: .Blood Blood-Peripheral  Preliminary Report (08-31-18 @ 01:01):    No growth to date.            CAPILLARY BLOOD GLUCOSE      POCT Blood Glucose.: 290 mg/dL (01 Sep 2018 07:45)      RADIOLOGY  CXR:      CT:    ECHO:      VITALS:  T(C): 36.7 (09-01-18 @ 05:46), Max: 37.3 (09-01-18 @ 00:17)  T(F): 98 (09-01-18 @ 05:46), Max: 99.2 (09-01-18 @ 00:17)  HR: 62 (09-01-18 @ 05:46) (56 - 72)  BP: 120/45 (09-01-18 @ 05:46) (112/47 - 153/72)  BP(mean): 115 (08-31-18 @ 21:00) (64 - 115)  ABP: --  ABP(mean): --  RR: 18 (09-01-18 @ 05:46) (8 - 26)  SpO2: 95% (09-01-18 @ 05:46) (94% - 98%)  CVP(mm Hg): --  CVP(cm H2O): --    Ins and Outs     08-31-18 @ 07:01  -  09-01-18 @ 07:00  --------------------------------------------------------  IN: 2100 mL / OUT: 4 mL / NET: 2096 mL        Height (cm): 167.64 (08-29-18 @ 22:02)  Weight (kg): 87 (08-29-18 @ 22:02)  BMI (kg/m2): 31 (08-29-18 @ 22:02)

## 2018-09-01 NOTE — PROGRESS NOTE ADULT - ASSESSMENT
Septic Shock due to UTI with metabolic Encephelopathy, DONNA and Hyperglycemia - non ketotic state due to likely UTI    shock, encephelopathy, donna, hyperglycemia now resolved     ?underlying dementia,   Underlying DM2, HTN      s/p lantus and insulin ss, a1c as above  IV abx as per ID  Glycemic control with insulin

## 2018-09-01 NOTE — PROGRESS NOTE ADULT - PROBLEM SELECTOR PLAN 1
continue IV ceftriaxone  follow up urine cultures  ID following  Septic Shock due to UTI with metabolic Encephelopathy, DONNA and Hyperglycemia now resolved

## 2018-09-01 NOTE — PROGRESS NOTE ADULT - ASSESSMENT
Physical Examination:  GENERAL:               Alert, oriented x 2, No acute distress.    HEENT:                    Symmetric. No JVD, Moist MM  PULM:                     Bilateral air entry, Clear to auscultation bilaterally, no significant sputum production, No Rales, No Rhonchi, No Wheezing  CVS:                         S1, S2,  + Murmur  ABD:                        Soft, nondistended, nontender, normoactive bowel sounds,   EXT:                         No edema, nontender, No Cyanosis or Clubbing   Vascular:                Warm Extremities, Normal Capillary refill, Normal Distal Pulses  SKIN:                       Warm and well perfused, no rashes noted.   NEURO:                  Alert, oriented, interactive, nonfocal, follows commands  PSYC:                      Calm, + Insight.      Assessment  Septic Shock due to UTI with metabolic Encephelopathy, DONNA and Hyperglycemia - non ketotic state due to likely UTI    shock, encephelopathy, donna, hyperglycemia now resolved     ?underlying dementia,   Underlying DM2, HTN    Plan  s/p lantus and insulin ss, a1c as above  IV abx as per ID  Glycemic control with insulin    PMD:			Tanner	                   Notified(Date): 8/31  Family Updated: 	Dtr	                                 Date: 8/30      Sedation & Analgesia:	n/a  Diet/Nutrition:		oral as tolerated  GI PPx:			protonix    DVT Ppx:		heparin      Activity:		               bed rest  Head of Bed:               35-45 deg  Glycemic Control:        insulin drip    Lines:  PIV    Disposition:  continue current care on amandeep, tanisha d/w Dr. Edmondson  Goals of Care: goc pending , currently full code

## 2018-09-01 NOTE — PROGRESS NOTE ADULT - SUBJECTIVE AND OBJECTIVE BOX
CC: f/u for possible UTI    Patient reports feeling well, incontinent, no pain    REVIEW OF SYSTEMS:  All other review of systems negative (Comprehensive ROS)    Antimicrobials Day # 4  cefTRIAXone   IVPB 1 Gram(s) IV Intermittent every 24 hours    Other Medications Reviewed    T(F): 98.2 (09-01-18 @ 20:35), Max: 99.2 (09-01-18 @ 00:17)  HR: 62 (09-01-18 @ 20:35)  BP: 154/71 (09-01-18 @ 20:35)  RR: 15 (09-01-18 @ 20:35)  SpO2: 96% (09-01-18 @ 20:35)  Wt(kg): --    PHYSICAL EXAM:  General: alert, no acute distress  Eyes:  anicteric, no conjunctival injection, no discharge  Oropharynx: no lesions or injection 	  Neck: supple, without adenopathy  Lungs: clear to auscultation  Heart: regular rate and rhythm; no murmur, rubs or gallops  Abdomen: soft, nondistended, nontender, without mass or organomegaly  Skin: no lesions  Extremities: no clubbing, cyanosis, or edema  Neurologic: alert, moves all extremities    LAB RESULTS:                        10.1   11.1  )-----------( 162      ( 01 Sep 2018 06:14 )             31.7     09-01    137  |  106  |  17  ----------------------------<  263<H>  4.2   |  25  |  0.80    Ca    9.0      01 Sep 2018 06:14  Phos  2.8     08-31  Mg     1.8     08-31          MICROBIOLOGY:  RECENT CULTURES:  08-29 @ 20:30 .Urine Clean Catch (Midstream)     Culture grew 3 or more types of organisms which indicate  collection contamination; consider recollection only if clinically  indicated.    08-29 @ 19:00 .Blood Blood-Peripheral     No growth to date.    RADIOLOGY REVIEWED:  Xray Chest 1 View-PORTABLE IMMEDIATE (08.29.18 @ 19:15) >  Clear  lungs

## 2018-09-01 NOTE — PROGRESS NOTE ADULT - ASSESSMENT
89 yo F, DM, resident assisted living, here with confusion, poorly controlled DM, hyperglycemia, elevated lactate, and leukocytosis- severe sepsis criteria.    On empiric CTX, remaining afebrile, Gluc now controlled, WBC moderating, hemodyn stable.    UA with only 3-5 WBCs, urine Cx contaminated, thus Dx of UTI less certain.    Bld Cxs negative thus far.    Plan:  Limit CTX to one more day

## 2018-09-01 NOTE — PROGRESS NOTE ADULT - SUBJECTIVE AND OBJECTIVE BOX
Patient is a 88 year old  Female who presents with a chief complaint of hyperglycemia / UTI (29 Aug 2018 21:48)    Patient seen and examined this morning.      MEDICATIONS  (STANDING):  allopurinol 300 milliGRAM(s) Oral daily  aspirin 325 milliGRAM(s) Oral daily  atorvastatin 40 milliGRAM(s) Oral at bedtime  cefTRIAXone   IVPB 1 Gram(s) IV Intermittent every 24 hours  dextrose 5%. 1000 milliLiter(s) (50 mL/Hr) IV Continuous <Continuous>  dextrose 50% Injectable 12.5 Gram(s) IV Push once  dextrose 50% Injectable 25 Gram(s) IV Push once  dextrose 50% Injectable 25 Gram(s) IV Push once  escitalopram 10 milliGRAM(s) Oral daily  gabapentin 100 milliGRAM(s) Oral two times a day  heparin  Injectable 5000 Unit(s) SubCutaneous every 8 hours  insulin glargine Injectable (LANTUS) 5 Unit(s) SubCutaneous at bedtime  insulin lispro (HumaLOG) corrective regimen sliding scale   SubCutaneous three times a day before meals  insulin lispro (HumaLOG) corrective regimen sliding scale   SubCutaneous at bedtime  pantoprazole    Tablet 40 milliGRAM(s) Oral before breakfast  propranolol 40 milliGRAM(s) Oral three times a day    MEDICATIONS  (PRN):  dextrose 40% Gel 15 Gram(s) Oral once PRN Blood Glucose LESS THAN 70 milliGRAM(s)/deciliter  glucagon  Injectable 1 milliGRAM(s) IntraMuscular once PRN Glucose LESS THAN 70 milligrams/deciliter  meclizine 25 milliGRAM(s) Oral two times a day PRN Dizziness      REVIEW OF SYSTEMS:  CONSTITUTIONAL: No fever, weight loss, or fatigue  EYES: No eye pain, visual disturbances, or discharge  ENMT:  No difficulty hearing, tinnitus, vertigo; No sinus or throat pain  NECK: No pain or stiffness  RESPIRATORY: No cough, wheezing, chills or hemoptysis; No shortness of breath  CARDIOVASCULAR: No chest pain, palpitations, dizziness, or leg swelling  GASTROINTESTINAL: No abdominal or epigastric pain. No nausea, vomiting, or hematemesis; No diarrhea or constipation. No melena or hematochezia.  GENITOURINARY: No dysuria, frequency, hematuria, or incontinence  NEUROLOGICAL: No headaches, memory loss, loss of strength, numbness, or tremors  SKIN: No itching, burning, rashes, or lesions   LYMPH NODES: No enlarged glands  ENDOCRINE: No heat or cold intolerance; No hair loss  MUSCULOSKELETAL: No joint pain or swelling; No muscle, back, or extremity pain  PSYCHIATRIC: No depression, anxiety, mood swings, or difficulty sleeping  HEME/LYMPH: No easy bruising, or bleeding gums  ALLERY AND IMMUNOLOGIC: No hives or eczema    PHYSICAL EXAM:    T(C): 36.7 (09-01-18 @ 05:46), Max: 37.3 (09-01-18 @ 00:17)  HR: 62 (09-01-18 @ 05:46) (56 - 72)  BP: 120/45 (09-01-18 @ 05:46) (112/47 - 153/72)  RR: 18 (09-01-18 @ 05:46) (8 - 26)  SpO2: 95% (09-01-18 @ 05:46) (95% - 98%)    I&O's Summary    31 Aug 2018 07:01  -  01 Sep 2018 07:00  --------------------------------------------------------  IN: 2100 mL / OUT: 4 mL / NET: 2096 mL        GENERAL: NAD, well-groomed, well-developed  HEAD:  Atraumatic, Normocephalic  EYES: EOMI, PERRL, conjunctiva and sclera clear  ENMT: No tonsillar erythema, exudates, or enlargement; Moist mucous membranes, Good dentition, No lesions  NECK: Supple, No JVD, Normal thyroid  NERVOUS SYSTEM:  Alert & Oriented X3, Good concentration; Motor Strength 5/5 B/L upper and lower extremities; DTRs 2+ intact and symmetric  CHEST/LUNG: Clear to ascultation bilaterally; No rales, rhonchi, wheezing, or rubs  HEART: Regular rate and rhythm; No murmurs, rubs, or gallops  ABDOMEN: Soft, Nontender, Nondistended; Bowel sounds present  EXTREMITIES:  2+ Peripheral Pulses, No clubbing, cyanosis, or edema  LYMPH: No lymphadenopathy noted  SKIN: No rashes or lesions    LABS:                        10.1   11.1  )-----------( 162      ( 01 Sep 2018 06:14 )             31.7     09-01    137  |  106  |  17  ----------------------------<  263<H>  4.2   |  25  |  0.80    Ca    9.0      01 Sep 2018 06:14  Phos  2.8     08-31  Mg     1.8     08-31          CAPILLARY BLOOD GLUCOSE  POCT Blood Glucose.: 290 mg/dL (01 Sep 2018 07:45)  POCT Blood Glucose.: 225 mg/dL (31 Aug 2018 21:01)  POCT Blood Glucose.: 227 mg/dL (31 Aug 2018 16:42)  POCT Blood Glucose.: 260 mg/dL (31 Aug 2018 10:52)          Culture - Urine (collected 08-29-18 @ 20:30)  Source: .Urine Clean Catch (Midstream)  Final Report (08-31-18 @ 10:10):    Culture grew 3 or more types of organisms which indicate    collection contamination; consider recollection only if clinically    indicated.    Culture - Blood (collected 08-29-18 @ 19:00)  Source: .Blood Blood-Peripheral  Preliminary Report (08-31-18 @ 01:01):    No growth to date.    Culture - Blood (collected 08-29-18 @ 19:00)  Source: .Blood Blood-Peripheral  Preliminary Report (08-31-18 @ 01:01):    No growth to date.        RADIOLOGY & ADDITIONAL TESTS:    Imaging Personally Reviewed:  [x] YES  [ ] NO    Consultant(s) Notes Reviewed:  [x] YES  [ ] NO    Care Discussed with Consultants/Other Providers [x] YES  [ ] NO Patient is a 88 year old  Female who presents with a chief complaint of hyperglycemia / UTI (29 Aug 2018 21:48)    Patient seen and examined this morning, reports feels well, denies any complaints.  Patient downgraded from telemetry floor    MEDICATIONS  (STANDING):  allopurinol 300 milliGRAM(s) Oral daily  aspirin 325 milliGRAM(s) Oral daily  atorvastatin 40 milliGRAM(s) Oral at bedtime  cefTRIAXone   IVPB 1 Gram(s) IV Intermittent every 24 hours  dextrose 5%. 1000 milliLiter(s) (50 mL/Hr) IV Continuous <Continuous>  dextrose 50% Injectable 12.5 Gram(s) IV Push once  dextrose 50% Injectable 25 Gram(s) IV Push once  dextrose 50% Injectable 25 Gram(s) IV Push once  escitalopram 10 milliGRAM(s) Oral daily  gabapentin 100 milliGRAM(s) Oral two times a day  heparin  Injectable 5000 Unit(s) SubCutaneous every 8 hours  insulin glargine Injectable (LANTUS) 5 Unit(s) SubCutaneous at bedtime  insulin lispro (HumaLOG) corrective regimen sliding scale   SubCutaneous three times a day before meals  insulin lispro (HumaLOG) corrective regimen sliding scale   SubCutaneous at bedtime  pantoprazole    Tablet 40 milliGRAM(s) Oral before breakfast  propranolol 40 milliGRAM(s) Oral three times a day    MEDICATIONS  (PRN):  dextrose 40% Gel 15 Gram(s) Oral once PRN Blood Glucose LESS THAN 70 milliGRAM(s)/deciliter  glucagon  Injectable 1 milliGRAM(s) IntraMuscular once PRN Glucose LESS THAN 70 milligrams/deciliter  meclizine 25 milliGRAM(s) Oral two times a day PRN Dizziness      REVIEW OF SYSTEMS:  CONSTITUTIONAL: No fever, weight loss. has fatigue  EYES: No eye pain, visual disturbances, or discharge  ENMT:  No difficulty hearing, tinnitus, vertigo; No sinus or throat pain  NECK: No pain or stiffness  RESPIRATORY: No cough, wheezing, chills or hemoptysis; No shortness of breath  CARDIOVASCULAR: No chest pain, palpitations, dizziness, or leg swelling  GASTROINTESTINAL: No abdominal or epigastric pain. No nausea, vomiting, or hematemesis; No diarrhea or constipation. No melena or hematochezia.  GENITOURINARY: No dysuria, frequency, hematuria, or incontinence  NEUROLOGICAL: No headaches, memory loss, loss of strength, numbness, or tremors  SKIN: No itching, burning, rashes, or lesions   ENDOCRINE: No heat or cold intolerance; No hair loss  MUSCULOSKELETAL: No joint pain or swelling; No muscle, back, or extremity pain  PSYCHIATRIC: No depression, anxiety, mood swings, or difficulty sleeping  HEME/LYMPH: No easy bruising, or bleeding gums  ALLERY AND IMMUNOLOGIC: No hives or eczema    PHYSICAL EXAM:    T(C): 36.7 (09-01-18 @ 05:46), Max: 37.3 (09-01-18 @ 00:17)  HR: 62 (09-01-18 @ 05:46) (56 - 72)  BP: 120/45 (09-01-18 @ 05:46) (112/47 - 153/72)  RR: 18 (09-01-18 @ 05:46) (8 - 26)  SpO2: 95% (09-01-18 @ 05:46) (95% - 98%)    I&O's Summary    31 Aug 2018 07:01  -  01 Sep 2018 07:00  --------------------------------------------------------  IN: 2100 mL / OUT: 4 mL / NET: 2096 mL        GENERAL: Elderly female, NAD, well-groomed, well-developed  HEAD:  Atraumatic, Normocephalic  EYES: EOMI, PERRL, conjunctiva and sclera clear  ENMT: No tonsillar erythema, exudates, or enlargement; Moist mucous membranes, Good dentition, No lesions  NECK: Supple, No JVD, Normal thyroid  NERVOUS SYSTEM:  Alert & Oriented X2, nonfocal, follows commands  CHEST/LUNG: Clear to ascultation bilaterally; No rales, rhonchi, wheezing, or rubs  HEART: Regular rate and rhythm; murmurs, no rubs, or gallops  ABDOMEN: Soft, Nontender, Nondistended; Bowel sounds present  EXTREMITIES:  2+ Peripheral Pulses, No clubbing, cyanosis, or edema  PSYCH: calm  SKIN: No rashes or lesions        LABS:                        10.1   11.1  )-----------( 162      ( 01 Sep 2018 06:14 )             31.7     09-01    137  |  106  |  17  ----------------------------<  263<H>  4.2   |  25  |  0.80    Ca    9.0      01 Sep 2018 06:14  Phos  2.8     08-31  Mg     1.8     08-31          CAPILLARY BLOOD GLUCOSE  POCT Blood Glucose.: 290 mg/dL (01 Sep 2018 07:45)  POCT Blood Glucose.: 225 mg/dL (31 Aug 2018 21:01)  POCT Blood Glucose.: 227 mg/dL (31 Aug 2018 16:42)  POCT Blood Glucose.: 260 mg/dL (31 Aug 2018 10:52)          Culture - Urine (collected 08-29-18 @ 20:30)  Source: .Urine Clean Catch (Midstream)  Final Report (08-31-18 @ 10:10):    Culture grew 3 or more types of organisms which indicate    collection contamination; consider recollection only if clinically    indicated.    Culture - Blood (collected 08-29-18 @ 19:00)  Source: .Blood Blood-Peripheral  Preliminary Report (08-31-18 @ 01:01):    No growth to date.    Culture - Blood (collected 08-29-18 @ 19:00)  Source: .Blood Blood-Peripheral  Preliminary Report (08-31-18 @ 01:01):    No growth to date.        RADIOLOGY & ADDITIONAL TESTS:    Imaging Personally Reviewed:  [x] YES  [ ] NO    Consultant(s) Notes Reviewed:  [x] YES  [ ] NO    Care Discussed with Consultants/Other Providers [x] YES  [ ] NO

## 2018-09-02 LAB
ALBUMIN SERPL ELPH-MCNC: 2.4 G/DL — LOW (ref 3.3–5)
ALP SERPL-CCNC: 69 U/L — SIGNIFICANT CHANGE UP (ref 40–120)
ALT FLD-CCNC: 21 U/L DA — SIGNIFICANT CHANGE UP (ref 10–45)
ANION GAP SERPL CALC-SCNC: 5 MMOL/L — SIGNIFICANT CHANGE UP (ref 5–17)
AST SERPL-CCNC: 24 U/L — SIGNIFICANT CHANGE UP (ref 10–40)
BASOPHILS # BLD AUTO: 0.1 K/UL — SIGNIFICANT CHANGE UP (ref 0–0.2)
BASOPHILS NFR BLD AUTO: 1 % — SIGNIFICANT CHANGE UP (ref 0–2)
BILIRUB SERPL-MCNC: 0.8 MG/DL — SIGNIFICANT CHANGE UP (ref 0.2–1.2)
BUN SERPL-MCNC: 17 MG/DL — SIGNIFICANT CHANGE UP (ref 7–23)
CALCIUM SERPL-MCNC: 10 MG/DL — SIGNIFICANT CHANGE UP (ref 8.4–10.5)
CHLORIDE SERPL-SCNC: 103 MMOL/L — SIGNIFICANT CHANGE UP (ref 96–108)
CO2 SERPL-SCNC: 28 MMOL/L — SIGNIFICANT CHANGE UP (ref 22–31)
CREAT SERPL-MCNC: 0.87 MG/DL — SIGNIFICANT CHANGE UP (ref 0.5–1.3)
EOSINOPHIL # BLD AUTO: 0.7 K/UL — HIGH (ref 0–0.5)
EOSINOPHIL NFR BLD AUTO: 5.8 % — SIGNIFICANT CHANGE UP (ref 0–6)
GLUCOSE BLDC GLUCOMTR-MCNC: 229 MG/DL — HIGH (ref 70–99)
GLUCOSE BLDC GLUCOMTR-MCNC: 256 MG/DL — HIGH (ref 70–99)
GLUCOSE BLDC GLUCOMTR-MCNC: 258 MG/DL — HIGH (ref 70–99)
GLUCOSE BLDC GLUCOMTR-MCNC: 274 MG/DL — HIGH (ref 70–99)
GLUCOSE SERPL-MCNC: 261 MG/DL — HIGH (ref 70–99)
HCT VFR BLD CALC: 35.2 % — SIGNIFICANT CHANGE UP (ref 34.5–45)
HGB BLD-MCNC: 11.5 G/DL — SIGNIFICANT CHANGE UP (ref 11.5–15.5)
LYMPHOCYTES # BLD AUTO: 27.2 % — SIGNIFICANT CHANGE UP (ref 13–44)
LYMPHOCYTES # BLD AUTO: 3.3 K/UL — SIGNIFICANT CHANGE UP (ref 1–3.3)
MCHC RBC-ENTMCNC: 28.3 PG — SIGNIFICANT CHANGE UP (ref 27–34)
MCHC RBC-ENTMCNC: 32.8 GM/DL — SIGNIFICANT CHANGE UP (ref 32–36)
MCV RBC AUTO: 86.3 FL — SIGNIFICANT CHANGE UP (ref 80–100)
MONOCYTES # BLD AUTO: 1 K/UL — HIGH (ref 0–0.9)
MONOCYTES NFR BLD AUTO: 8.4 % — SIGNIFICANT CHANGE UP (ref 2–14)
NEUTROPHILS # BLD AUTO: 7 K/UL — SIGNIFICANT CHANGE UP (ref 1.8–7.4)
NEUTROPHILS NFR BLD AUTO: 57.6 % — SIGNIFICANT CHANGE UP (ref 43–77)
PLATELET # BLD AUTO: 190 K/UL — SIGNIFICANT CHANGE UP (ref 150–400)
POTASSIUM SERPL-MCNC: 4.5 MMOL/L — SIGNIFICANT CHANGE UP (ref 3.5–5.3)
POTASSIUM SERPL-SCNC: 4.5 MMOL/L — SIGNIFICANT CHANGE UP (ref 3.5–5.3)
PROT SERPL-MCNC: 6.4 G/DL — SIGNIFICANT CHANGE UP (ref 6–8.3)
RBC # BLD: 4.08 M/UL — SIGNIFICANT CHANGE UP (ref 3.8–5.2)
RBC # FLD: 13.7 % — SIGNIFICANT CHANGE UP (ref 10.3–14.5)
SODIUM SERPL-SCNC: 136 MMOL/L — SIGNIFICANT CHANGE UP (ref 135–145)
WBC # BLD: 12.1 K/UL — HIGH (ref 3.8–10.5)
WBC # FLD AUTO: 12.1 K/UL — HIGH (ref 3.8–10.5)

## 2018-09-02 PROCEDURE — 99233 SBSQ HOSP IP/OBS HIGH 50: CPT

## 2018-09-02 RX ORDER — INSULIN LISPRO 100/ML
2 VIAL (ML) SUBCUTANEOUS
Qty: 0 | Refills: 0 | Status: DISCONTINUED | OUTPATIENT
Start: 2018-09-02 | End: 2018-09-03

## 2018-09-02 RX ORDER — INSULIN GLARGINE 100 [IU]/ML
10 INJECTION, SOLUTION SUBCUTANEOUS EVERY MORNING
Qty: 0 | Refills: 0 | Status: DISCONTINUED | OUTPATIENT
Start: 2018-09-02 | End: 2018-09-06

## 2018-09-02 RX ADMIN — Medication 2 UNIT(S): at 12:08

## 2018-09-02 RX ADMIN — Medication 325 MILLIGRAM(S): at 12:07

## 2018-09-02 RX ADMIN — CEFTRIAXONE 100 GRAM(S): 500 INJECTION, POWDER, FOR SOLUTION INTRAMUSCULAR; INTRAVENOUS at 05:20

## 2018-09-02 RX ADMIN — Medication 40 MILLIGRAM(S): at 14:37

## 2018-09-02 RX ADMIN — Medication 6: at 12:08

## 2018-09-02 RX ADMIN — GABAPENTIN 100 MILLIGRAM(S): 400 CAPSULE ORAL at 05:20

## 2018-09-02 RX ADMIN — HEPARIN SODIUM 5000 UNIT(S): 5000 INJECTION INTRAVENOUS; SUBCUTANEOUS at 14:37

## 2018-09-02 RX ADMIN — Medication 40 MILLIGRAM(S): at 05:20

## 2018-09-02 RX ADMIN — ATORVASTATIN CALCIUM 40 MILLIGRAM(S): 80 TABLET, FILM COATED ORAL at 21:33

## 2018-09-02 RX ADMIN — Medication 6: at 17:07

## 2018-09-02 RX ADMIN — INSULIN GLARGINE 10 UNIT(S): 100 INJECTION, SOLUTION SUBCUTANEOUS at 09:05

## 2018-09-02 RX ADMIN — Medication 2 UNIT(S): at 17:07

## 2018-09-02 RX ADMIN — GABAPENTIN 100 MILLIGRAM(S): 400 CAPSULE ORAL at 17:07

## 2018-09-02 RX ADMIN — Medication 300 MILLIGRAM(S): at 12:08

## 2018-09-02 RX ADMIN — PANTOPRAZOLE SODIUM 40 MILLIGRAM(S): 20 TABLET, DELAYED RELEASE ORAL at 05:20

## 2018-09-02 RX ADMIN — Medication 4: at 07:59

## 2018-09-02 RX ADMIN — HEPARIN SODIUM 5000 UNIT(S): 5000 INJECTION INTRAVENOUS; SUBCUTANEOUS at 05:21

## 2018-09-02 RX ADMIN — Medication 40 MILLIGRAM(S): at 21:33

## 2018-09-02 RX ADMIN — HEPARIN SODIUM 5000 UNIT(S): 5000 INJECTION INTRAVENOUS; SUBCUTANEOUS at 21:33

## 2018-09-02 RX ADMIN — Medication 2: at 21:33

## 2018-09-02 RX ADMIN — ESCITALOPRAM OXALATE 10 MILLIGRAM(S): 10 TABLET, FILM COATED ORAL at 12:08

## 2018-09-02 NOTE — PROGRESS NOTE ADULT - SUBJECTIVE AND OBJECTIVE BOX
CC: f/u for possible UTI    Patient resting comfortably, in good spirits, still confused, incontinent, no pain    REVIEW OF SYSTEMS:  All other review of systems negative (Comprehensive ROS)    Antimicrobials Day # 5  cefTRIAXone   IVPB 1 Gram(s) IV Intermittent every 24 hours    Other Medications Reviewed    Vital Signs Last 24 Hrs  T(F): 98.8 (02 Sep 2018 04:47), Max: 98.8 (02 Sep 2018 04:47)  HR: 62 (02 Sep 2018 04:47) (55 - 62)  BP: 149/77 (02 Sep 2018 04:47) (133/70 - 154/71)  BP(mean): --  RR: 15 (02 Sep 2018 04:47) (15 - 15)  SpO2: 95% (02 Sep 2018 04:47) (95% - 96%)    PHYSICAL EXAM:  General: alert, no acute distress  Eyes:  anicteric, no conjunctival injection, no discharge  Oropharynx: no lesions or injection 	  Neck: supple, without adenopathy  Lungs: clear to auscultation  Heart: regular rate and rhythm; no murmur, rubs or gallops  Abdomen: soft, nondistended, nontender, without mass or organomegaly  Skin: no lesions  Extremities: no clubbing, cyanosis, or edema  Neurologic: alert, moves all extremities    LAB RESULTS:                        11.5   12.1  )-----------( 190      ( 02 Sep 2018 06:01 )             35.2   09-02    136  |  103  |  17  ----------------------------<  261<H>  4.5   |  28  |  0.87    Ca    10.0      02 Sep 2018 06:01  Phos  2.8     08-31  Mg     1.8     08-31    TPro  6.4  /  Alb  2.4<L>  /  TBili  0.8  /  DBili  x   /  AST  24  /  ALT  21  /  AlkPhos  69  09-02      MICROBIOLOGY:  RECENT CULTURES:  08-29 @ 20:30 .Urine Clean Catch (Midstream)     Culture grew 3 or more types of organisms which indicate  collection contamination; consider recollection only if clinically  indicated.    08-29 @ 19:00 .Blood Blood-Peripheral     No growth to date.    RADIOLOGY REVIEWED:  Xray Chest 1 View-PORTABLE IMMEDIATE (08.29.18 @ 19:15) >  Clear  lungs

## 2018-09-02 NOTE — PROGRESS NOTE ADULT - ASSESSMENT
89 yo F, DM, resident assisted living, here with confusion, poorly controlled DM, hyperglycemia, elevated lactate, and leukocytosis- severe sepsis criteria.    On empiric CTX, remaining afebrile, Gluc controlled, WBC moderating, hemodyn stable.    UA with only 3-5 WBCs, urine Cx contaminated, thus Dx of UTI less certain.    Bld Cxs negative   No signs of infection    Plan:  Last day of CTX

## 2018-09-02 NOTE — PROGRESS NOTE ADULT - ASSESSMENT
Physical Examination:  GENERAL:               Alert, oriented x 2, No acute distress.    HEENT:                    Symmetric. No JVD, Moist MM  PULM:                     Bilateral air entry, Clear to auscultation bilaterally, no significant sputum production, No Rales, No Rhonchi, No Wheezing  CVS:                         S1, S2,  + Murmur  ABD:                        Soft, nondistended, nontender, normoactive bowel sounds,   EXT:                         No edema, nontender, No Cyanosis or Clubbing   Vascular:                Warm Extremities, Normal Capillary refill, Normal Distal Pulses  SKIN:                       Warm and well perfused, no rashes noted.   NEURO:                  Alert, oriented, interactive, nonfocal, follows commands  PSYC:                      Calm, + Insight.      Assessment  Septic Shock due to UTI with metabolic Encephelopathy, DONNA and Hyperglycemia - non ketotic state due to likely UTI    shock, encephelopathy, donna, hyperglycemia now resolved     ?underlying dementia,   Underlying DM2, HTN    Plan  IV abx as per ID  Glycemic control with insulin    PMD:			Tanner	                   Notified(Date): 8/31  Family Updated: 	Dtr	                                 Date: 8/30      Sedation & Analgesia:	n/a  Diet/Nutrition:		oral as tolerated  GI PPx:			protonix    DVT Ppx:		heparin      Activity:		               bed rest  Head of Bed:               35-45 deg  Glycemic Control:        insulin drip    Lines:  PIV    Disposition:  continue current care on amandeep, case d/w Dr. Edmondson, at this time no active ccm issues reconsult as needed.   Goals of Care: goc pending , currently full code

## 2018-09-02 NOTE — PROGRESS NOTE ADULT - SUBJECTIVE AND OBJECTIVE BOX
Follow-up Critical Care Progress Note  Chief Complaint : Type 2 diabetes mellitus with hyperosmolar coma        No new events overnight.  Denies SOB/CP.       Allergies :penicillin (Unknown)  predniSONE (Unknown)      PAST MEDICAL & SURGICAL HISTORY:  Hyperlipemia  Major depressive disorder  GERD (gastroesophageal reflux disease)  Osteoarthritis  Vertigo  H/O: gout  HTN (hypertension)  Diabetes  No significant past surgical history      Medications:  MEDICATIONS  (STANDING):  allopurinol 300 milliGRAM(s) Oral daily  aspirin 325 milliGRAM(s) Oral daily  atorvastatin 40 milliGRAM(s) Oral at bedtime  dextrose 5%. 1000 milliLiter(s) (50 mL/Hr) IV Continuous <Continuous>  dextrose 50% Injectable 12.5 Gram(s) IV Push once  dextrose 50% Injectable 25 Gram(s) IV Push once  dextrose 50% Injectable 25 Gram(s) IV Push once  escitalopram 10 milliGRAM(s) Oral daily  gabapentin 100 milliGRAM(s) Oral two times a day  heparin  Injectable 5000 Unit(s) SubCutaneous every 8 hours  insulin glargine Injectable (LANTUS) 10 Unit(s) SubCutaneous every morning  insulin lispro (HumaLOG) corrective regimen sliding scale   SubCutaneous three times a day before meals  insulin lispro (HumaLOG) corrective regimen sliding scale   SubCutaneous at bedtime  insulin lispro Injectable (HumaLOG) 2 Unit(s) SubCutaneous three times a day before meals  pantoprazole    Tablet 40 milliGRAM(s) Oral before breakfast  propranolol 40 milliGRAM(s) Oral three times a day    MEDICATIONS  (PRN):  dextrose 40% Gel 15 Gram(s) Oral once PRN Blood Glucose LESS THAN 70 milliGRAM(s)/deciliter  glucagon  Injectable 1 milliGRAM(s) IntraMuscular once PRN Glucose LESS THAN 70 milligrams/deciliter  meclizine 25 milliGRAM(s) Oral two times a day PRN Dizziness      LABS:                        11.5   12.1  )-----------( 190      ( 02 Sep 2018 06:01 )             35.2     09-02    136  |  103  |  17  ----------------------------<  261<H>  4.5   |  28  |  0.87    Ca    10.0      02 Sep 2018 06:01  Phos  2.8     08-31  Mg     1.8     08-31    TPro  6.4  /  Alb  2.4<L>  /  TBili  0.8  /  DBili  x   /  AST  24  /  ALT  21  /  AlkPhos  69  09-02      Glucose Trend  09-02-18 @ 11:43   -  -- -- 274<H>  09-02-18 @ 07:31   -  -- -- 229<H>  09-02-18 @ 06:01   -  -- 261<H> --  09-01-18 @ 20:35   -  -- -- 300<H>  09-01-18 @ 16:31   -  -- -- 292<H>  09-01-18 @ 11:56   -  -- -- 344<H>  09-01-18 @ 07:45   -  -- -- 290<H>  09-01-18 @ 06:14   -  -- 263<H> --  08-31-18 @ 21:01   -  -- -- 225<H>  08-31-18 @ 16:42   -  -- -- 227<H>    Hemoglobin A1C, Whole Blood: 11.7 % <H> (08-31-18 @ 04:30)          CULTURES: (if applicable)    Culture - Urine (collected 08-29-18 @ 20:30)  Source: .Urine Clean Catch (Midstream)  Final Report (08-31-18 @ 10:10):    Culture grew 3 or more types of organisms which indicate    collection contamination; consider recollection only if clinically    indicated.    Culture - Blood (collected 08-29-18 @ 19:00)  Source: .Blood Blood-Peripheral  Preliminary Report (08-31-18 @ 01:01):    No growth to date.    Culture - Blood (collected 08-29-18 @ 19:00)  Source: .Blood Blood-Peripheral  Preliminary Report (08-31-18 @ 01:01):    No growth to date.            CAPILLARY BLOOD GLUCOSE      POCT Blood Glucose.: 274 mg/dL (02 Sep 2018 11:43)        VITALS:  T(C): 37.1 (09-02-18 @ 04:47), Max: 37.1 (09-02-18 @ 04:47)  T(F): 98.8 (09-02-18 @ 04:47), Max: 98.8 (09-02-18 @ 04:47)  HR: 62 (09-02-18 @ 04:47) (55 - 62)  BP: 149/77 (09-02-18 @ 04:47) (133/70 - 154/71)  BP(mean): --  ABP: --  ABP(mean): --  RR: 15 (09-02-18 @ 04:47) (15 - 15)  SpO2: 95% (09-02-18 @ 04:47) (95% - 96%)  CVP(mm Hg): --  CVP(cm H2O): --    Ins and Outs     09-01-18 @ 07:01  -  09-02-18 @ 07:00  --------------------------------------------------------  IN: 650 mL / OUT: 0 mL / NET: 650 mL

## 2018-09-02 NOTE — PROGRESS NOTE ADULT - SUBJECTIVE AND OBJECTIVE BOX
Patient is a 88 year old  Female who presents with a chief complaint of hyperglycemia / UTI (29 Aug 2018 21:48)    Patient seen and examined this morning, reports feels well, denies any complaints.       MEDICATIONS  (STANDING):  allopurinol 300 milliGRAM(s) Oral daily  aspirin 325 milliGRAM(s) Oral daily  atorvastatin 40 milliGRAM(s) Oral at bedtime  dextrose 5%. 1000 milliLiter(s) (50 mL/Hr) IV Continuous <Continuous>  dextrose 50% Injectable 12.5 Gram(s) IV Push once  dextrose 50% Injectable 25 Gram(s) IV Push once  dextrose 50% Injectable 25 Gram(s) IV Push once  escitalopram 10 milliGRAM(s) Oral daily  gabapentin 100 milliGRAM(s) Oral two times a day  heparin  Injectable 5000 Unit(s) SubCutaneous every 8 hours  insulin glargine Injectable (LANTUS) 10 Unit(s) SubCutaneous every morning  insulin lispro (HumaLOG) corrective regimen sliding scale   SubCutaneous three times a day before meals  insulin lispro (HumaLOG) corrective regimen sliding scale   SubCutaneous at bedtime  insulin lispro Injectable (HumaLOG) 2 Unit(s) SubCutaneous three times a day before meals  pantoprazole    Tablet 40 milliGRAM(s) Oral before breakfast  propranolol 40 milliGRAM(s) Oral three times a day    MEDICATIONS  (PRN):  dextrose 40% Gel 15 Gram(s) Oral once PRN Blood Glucose LESS THAN 70 milliGRAM(s)/deciliter  glucagon  Injectable 1 milliGRAM(s) IntraMuscular once PRN Glucose LESS THAN 70 milligrams/deciliter  meclizine 25 milliGRAM(s) Oral two times a day PRN Dizziness      REVIEW OF SYSTEMS:  CONSTITUTIONAL: No fever, weight loss. has fatigue  EYES: No eye pain, visual disturbances, or discharge  ENMT:  No difficulty hearing, tinnitus, vertigo; No sinus or throat pain  NECK: No pain or stiffness  RESPIRATORY: No cough, wheezing, chills or hemoptysis; No shortness of breath  CARDIOVASCULAR: No chest pain, palpitations, dizziness, or leg swelling  GASTROINTESTINAL: No abdominal or epigastric pain. No nausea, vomiting, or hematemesis; No diarrhea or constipation. No melena or hematochezia.  GENITOURINARY: No dysuria, frequency, hematuria, or incontinence  NEUROLOGICAL: No headaches, memory loss, loss of strength, numbness, or tremors  SKIN: No itching, burning, rashes, or lesions   ENDOCRINE: No heat or cold intolerance; No hair loss  MUSCULOSKELETAL: No joint pain or swelling; No muscle, back, or extremity pain  PSYCHIATRIC: No depression, anxiety, mood swings, or difficulty sleeping  HEME/LYMPH: No easy bruising, or bleeding gums  ALLERGY AND IMMUNOLOGIC: No hives or eczema    PHYSICAL EXAM:  T(C): 37.1 (09-02-18 @ 04:47), Max: 37.1 (09-02-18 @ 04:47)  HR: 62 (09-02-18 @ 04:47) (55 - 62)  BP: 149/77 (09-02-18 @ 04:47) (133/70 - 154/71)  RR: 15 (09-02-18 @ 04:47) (15 - 15)  SpO2: 95% (09-02-18 @ 04:47) (95% - 96%)    I&O's Summary    01 Sep 2018 07:01  -  02 Sep 2018 07:00  --------------------------------------------------------  IN: 650 mL / OUT: 0 mL / NET: 650 mL      GENERAL: Elderly female, NAD, well-groomed, well-developed  HEAD:  Atraumatic, Normocephalic  EYES: EOMI, PERRL, conjunctiva and sclera clear  ENMT: Moist mucous membranes, Good dentition, No lesions  NECK: Supple, No JVD, Normal thyroid  NERVOUS SYSTEM:  Alert & Oriented X3, nonfocal, follows commands  CHEST/LUNG: Clear to ascultation bilaterally; No rales, rhonchi, wheezing, or rubs  HEART: Regular rate and rhythm; murmurs, no rubs, or gallops  ABDOMEN: Soft, Nontender, Nondistended; Bowel sounds present  EXTREMITIES:  2+ Peripheral Pulses, No clubbing, cyanosis, or edema  PSYCH: calm  SKIN: No rashes or lesions    LABS:                        11.5   12.1  )-----------( 190      ( 02 Sep 2018 06:01 )             35.2     09-02    136  |  103  |  17  ----------------------------<  261<H>  4.5   |  28  |  0.87    Ca    10.0      02 Sep 2018 06:01  Phos  2.8     08-31  Mg     1.8     08-31    TPro  6.4  /  Alb  2.4<L>  /  TBili  0.8  /  DBili  x   /  AST  24  /  ALT  21  /  AlkPhos  69  09-02        CAPILLARY BLOOD GLUCOSE  POCT Blood Glucose.: 274 mg/dL (02 Sep 2018 11:43)  POCT Blood Glucose.: 229 mg/dL (02 Sep 2018 07:31)  POCT Blood Glucose.: 300 mg/dL (01 Sep 2018 20:35)  POCT Blood Glucose.: 292 mg/dL (01 Sep 2018 16:31)          Culture - Urine (collected 08-29-18 @ 20:30)  Source: .Urine Clean Catch (Midstream)  Final Report (08-31-18 @ 10:10):    Culture grew 3 or more types of organisms which indicate    collection contamination; consider recollection only if clinically    indicated.    Culture - Blood (collected 08-29-18 @ 19:00)  Source: .Blood Blood-Peripheral  Preliminary Report (08-31-18 @ 01:01):    No growth to date.    Culture - Blood (collected 08-29-18 @ 19:00)  Source: .Blood Blood-Peripheral  Preliminary Report (08-31-18 @ 01:01):    No growth to date.        RADIOLOGY & ADDITIONAL TESTS:    Imaging Personally Reviewed:  [x] YES  [ ] NO    Consultant(s) Notes Reviewed:  [x] YES  [ ] NO    Care Discussed with Consultants/Other Providers [x] YES  [ ] NO

## 2018-09-03 DIAGNOSIS — K92.1 MELENA: ICD-10-CM

## 2018-09-03 LAB
ALBUMIN SERPL ELPH-MCNC: 2.2 G/DL — LOW (ref 3.3–5)
ALP SERPL-CCNC: 77 U/L — SIGNIFICANT CHANGE UP (ref 40–120)
ALT FLD-CCNC: 36 U/L DA — SIGNIFICANT CHANGE UP (ref 10–45)
ANION GAP SERPL CALC-SCNC: 7 MMOL/L — SIGNIFICANT CHANGE UP (ref 5–17)
AST SERPL-CCNC: 51 U/L — HIGH (ref 10–40)
BILIRUB SERPL-MCNC: 0.7 MG/DL — SIGNIFICANT CHANGE UP (ref 0.2–1.2)
BUN SERPL-MCNC: 20 MG/DL — SIGNIFICANT CHANGE UP (ref 7–23)
CALCIUM SERPL-MCNC: 9.8 MG/DL — SIGNIFICANT CHANGE UP (ref 8.4–10.5)
CHLORIDE SERPL-SCNC: 102 MMOL/L — SIGNIFICANT CHANGE UP (ref 96–108)
CO2 SERPL-SCNC: 28 MMOL/L — SIGNIFICANT CHANGE UP (ref 22–31)
CREAT SERPL-MCNC: 0.86 MG/DL — SIGNIFICANT CHANGE UP (ref 0.5–1.3)
GLUCOSE BLDC GLUCOMTR-MCNC: 174 MG/DL — HIGH (ref 70–99)
GLUCOSE BLDC GLUCOMTR-MCNC: 223 MG/DL — HIGH (ref 70–99)
GLUCOSE BLDC GLUCOMTR-MCNC: 233 MG/DL — HIGH (ref 70–99)
GLUCOSE BLDC GLUCOMTR-MCNC: 263 MG/DL — HIGH (ref 70–99)
GLUCOSE SERPL-MCNC: 262 MG/DL — HIGH (ref 70–99)
HCT VFR BLD CALC: 33 % — LOW (ref 34.5–45)
HGB BLD-MCNC: 10.7 G/DL — LOW (ref 11.5–15.5)
MCHC RBC-ENTMCNC: 28.2 PG — SIGNIFICANT CHANGE UP (ref 27–34)
MCHC RBC-ENTMCNC: 32.5 GM/DL — SIGNIFICANT CHANGE UP (ref 32–36)
MCV RBC AUTO: 86.6 FL — SIGNIFICANT CHANGE UP (ref 80–100)
PLATELET # BLD AUTO: 196 K/UL — SIGNIFICANT CHANGE UP (ref 150–400)
POTASSIUM SERPL-MCNC: 4.2 MMOL/L — SIGNIFICANT CHANGE UP (ref 3.5–5.3)
POTASSIUM SERPL-SCNC: 4.2 MMOL/L — SIGNIFICANT CHANGE UP (ref 3.5–5.3)
PROT SERPL-MCNC: 6 G/DL — SIGNIFICANT CHANGE UP (ref 6–8.3)
RBC # BLD: 3.81 M/UL — SIGNIFICANT CHANGE UP (ref 3.8–5.2)
RBC # FLD: 14 % — SIGNIFICANT CHANGE UP (ref 10.3–14.5)
SODIUM SERPL-SCNC: 137 MMOL/L — SIGNIFICANT CHANGE UP (ref 135–145)
WBC # BLD: 10.6 K/UL — HIGH (ref 3.8–10.5)
WBC # FLD AUTO: 10.6 K/UL — HIGH (ref 3.8–10.5)

## 2018-09-03 PROCEDURE — 99233 SBSQ HOSP IP/OBS HIGH 50: CPT

## 2018-09-03 RX ORDER — INSULIN LISPRO 100/ML
3 VIAL (ML) SUBCUTANEOUS
Qty: 0 | Refills: 0 | Status: DISCONTINUED | OUTPATIENT
Start: 2018-09-03 | End: 2018-09-06

## 2018-09-03 RX ADMIN — ESCITALOPRAM OXALATE 10 MILLIGRAM(S): 10 TABLET, FILM COATED ORAL at 12:00

## 2018-09-03 RX ADMIN — ATORVASTATIN CALCIUM 40 MILLIGRAM(S): 80 TABLET, FILM COATED ORAL at 22:50

## 2018-09-03 RX ADMIN — PANTOPRAZOLE SODIUM 40 MILLIGRAM(S): 20 TABLET, DELAYED RELEASE ORAL at 06:50

## 2018-09-03 RX ADMIN — Medication 40 MILLIGRAM(S): at 14:52

## 2018-09-03 RX ADMIN — Medication 40 MILLIGRAM(S): at 06:50

## 2018-09-03 RX ADMIN — HEPARIN SODIUM 5000 UNIT(S): 5000 INJECTION INTRAVENOUS; SUBCUTANEOUS at 06:49

## 2018-09-03 RX ADMIN — Medication 2: at 08:26

## 2018-09-03 RX ADMIN — Medication 3 UNIT(S): at 17:15

## 2018-09-03 RX ADMIN — GABAPENTIN 100 MILLIGRAM(S): 400 CAPSULE ORAL at 17:16

## 2018-09-03 RX ADMIN — Medication 3 UNIT(S): at 12:00

## 2018-09-03 RX ADMIN — HEPARIN SODIUM 5000 UNIT(S): 5000 INJECTION INTRAVENOUS; SUBCUTANEOUS at 22:51

## 2018-09-03 RX ADMIN — INSULIN GLARGINE 10 UNIT(S): 100 INJECTION, SOLUTION SUBCUTANEOUS at 08:26

## 2018-09-03 RX ADMIN — Medication 40 MILLIGRAM(S): at 22:51

## 2018-09-03 RX ADMIN — HEPARIN SODIUM 5000 UNIT(S): 5000 INJECTION INTRAVENOUS; SUBCUTANEOUS at 14:52

## 2018-09-03 RX ADMIN — Medication 300 MILLIGRAM(S): at 12:00

## 2018-09-03 RX ADMIN — Medication 2 UNIT(S): at 08:26

## 2018-09-03 RX ADMIN — Medication 4: at 17:15

## 2018-09-03 RX ADMIN — GABAPENTIN 100 MILLIGRAM(S): 400 CAPSULE ORAL at 06:50

## 2018-09-03 RX ADMIN — Medication 6: at 12:00

## 2018-09-03 NOTE — PHYSICAL THERAPY INITIAL EVALUATION ADULT - ADDITIONAL COMMENTS
Pt lives at [facility].  Pt has not ambulated for years, is WC bound.  Requires assist for transfers OOB

## 2018-09-03 NOTE — PROGRESS NOTE ADULT - SUBJECTIVE AND OBJECTIVE BOX
Patient is a 88 year old  Female who presents with a chief complaint of hyperglycemia / UTI (29 Aug 2018 21:48)    Patient seen and examined this morning. Nurse reports blood in stool today.  Patient reports she feels well, denies any complaints.     MEDICATIONS  (STANDING):  allopurinol 300 milliGRAM(s) Oral daily  aspirin 325 milliGRAM(s) Oral daily  atorvastatin 40 milliGRAM(s) Oral at bedtime  dextrose 5%. 1000 milliLiter(s) (50 mL/Hr) IV Continuous <Continuous>  dextrose 50% Injectable 12.5 Gram(s) IV Push once  dextrose 50% Injectable 25 Gram(s) IV Push once  dextrose 50% Injectable 25 Gram(s) IV Push once  escitalopram 10 milliGRAM(s) Oral daily  gabapentin 100 milliGRAM(s) Oral two times a day  heparin  Injectable 5000 Unit(s) SubCutaneous every 8 hours  insulin glargine Injectable (LANTUS) 10 Unit(s) SubCutaneous every morning  insulin lispro (HumaLOG) corrective regimen sliding scale   SubCutaneous three times a day before meals  insulin lispro (HumaLOG) corrective regimen sliding scale   SubCutaneous at bedtime  insulin lispro Injectable (HumaLOG) 3 Unit(s) SubCutaneous three times a day with meals  pantoprazole    Tablet 40 milliGRAM(s) Oral before breakfast  propranolol 40 milliGRAM(s) Oral three times a day    MEDICATIONS  (PRN):  dextrose 40% Gel 15 Gram(s) Oral once PRN Blood Glucose LESS THAN 70 milliGRAM(s)/deciliter  glucagon  Injectable 1 milliGRAM(s) IntraMuscular once PRN Glucose LESS THAN 70 milligrams/deciliter  meclizine 25 milliGRAM(s) Oral two times a day PRN Dizziness      REVIEW OF SYSTEMS:  CONSTITUTIONAL: No fever, weight loss. has fatigue  EYES: No eye pain, visual disturbances, or discharge  ENMT:  No difficulty hearing, tinnitus, vertigo; No sinus or throat pain  NECK: No pain or stiffness  RESPIRATORY: No cough, wheezing, chills or hemoptysis; No shortness of breath  CARDIOVASCULAR: No chest pain, palpitations, dizziness, or leg swelling  GASTROINTESTINAL: No abdominal or epigastric pain. No nausea, vomiting, or hematemesis; No diarrhea or constipation. melena or hematochezia.  GENITOURINARY: No dysuria, frequency, hematuria, or incontinence  NEUROLOGICAL: No headaches, memory loss, loss of strength, numbness, or tremors  SKIN: No itching, burning, rashes, or lesions   ENDOCRINE: No heat or cold intolerance; No hair loss  MUSCULOSKELETAL: No joint pain or swelling; No muscle, back, or extremity pain  PSYCHIATRIC: No depression, anxiety, mood swings, or difficulty sleeping  HEME/LYMPH: No easy bruising, or bleeding gums  ALLERGY AND IMMUNOLOGIC: No hives or eczema      PHYSICAL EXAM:  T(C): 37.1 (09-03-18 @ 05:45), Max: 37.1 (09-03-18 @ 05:45)  HR: 56 (09-03-18 @ 05:45) (56 - 65)  BP: 122/71 (09-03-18 @ 05:45) (122/71 - 159/84)  RR: 14 (09-03-18 @ 05:45) (14 - 15)  SpO2: 95% (09-03-18 @ 05:45) (94% - 95%)    I&O's Summary    02 Sep 2018 07:01  -  03 Sep 2018 07:00  --------------------------------------------------------  IN: 480 mL / OUT: 2 mL / NET: 478 mL    03 Sep 2018 07:01  -  03 Sep 2018 12:13  --------------------------------------------------------  IN: 360 mL / OUT: 0 mL / NET: 360 mL        GENERAL: Elderly female, NAD, well-groomed, well-developed  HEAD:  Atraumatic, Normocephalic  EYES: EOMI, PERRL, conjunctiva and sclera clear  ENMT: Moist mucous membranes, Good dentition, No lesions  NECK: Supple, No JVD, Normal thyroid  NERVOUS SYSTEM:  Alert & Oriented X3, nonfocal, follows commands  CHEST/LUNG: Clear to ascultation bilaterally; No rales, rhonchi, wheezing, or rubs  HEART: Regular rate and rhythm; murmurs, no rubs, or gallops  ABDOMEN: Soft, Nontender, Nondistended; Bowel sounds present  EXTREMITIES:  2+ Peripheral Pulses, No clubbing, cyanosis, or edema  PSYCH: calm  SKIN: No rashes or lesions      LABS:                        11.5   12.1  )-----------( 190      ( 02 Sep 2018 06:01 )             35.2     09-02    136  |  103  |  17  ----------------------------<  261<H>  4.5   |  28  |  0.87    Ca    10.0      02 Sep 2018 06:01    TPro  6.4  /  Alb  2.4<L>  /  TBili  0.8  /  DBili  x   /  AST  24  /  ALT  21  /  AlkPhos  69  09-02        CAPILLARY BLOOD GLUCOSE  POCT Blood Glucose.: 263 mg/dL (03 Sep 2018 11:56)  POCT Blood Glucose.: 174 mg/dL (03 Sep 2018 08:01)  POCT Blood Glucose.: 256 mg/dL (02 Sep 2018 20:42)  POCT Blood Glucose.: 258 mg/dL (02 Sep 2018 16:43)          Culture - Urine (collected 08-29-18 @ 20:30)  Source: .Urine Clean Catch (Midstream)  Final Report (08-31-18 @ 10:10):    Culture grew 3 or more types of organisms which indicate    collection contamination; consider recollection only if clinically    indicated.    Culture - Blood (collected 08-29-18 @ 19:00)  Source: .Blood Blood-Peripheral  Preliminary Report (08-31-18 @ 01:01):    No growth to date.    Culture - Blood (collected 08-29-18 @ 19:00)  Source: .Blood Blood-Peripheral  Preliminary Report (08-31-18 @ 01:01):    No growth to date.        RADIOLOGY & ADDITIONAL TESTS:    Imaging Personally Reviewed:  [x] YES  [ ] NO    Consultant(s) Notes Reviewed:  [x] YES  [ ] NO    Care Discussed with Consultants/Other Providers [x] YES  [ ] NO

## 2018-09-03 NOTE — PROGRESS NOTE ADULT - PROBLEM SELECTOR PLAN 1
IV ceftriaxone completed  UA with only 3-5 WBCs, urine Cx contaminated,  ID following  Septic Shock due to UTI with metabolic Encephelopathy, DONNA and Hyperglycemia now resolved

## 2018-09-04 LAB
ANION GAP SERPL CALC-SCNC: 6 MMOL/L — SIGNIFICANT CHANGE UP (ref 5–17)
BASOPHILS # BLD AUTO: 0.2 K/UL — SIGNIFICANT CHANGE UP (ref 0–0.2)
BASOPHILS NFR BLD AUTO: 1.5 % — SIGNIFICANT CHANGE UP (ref 0–2)
BUN SERPL-MCNC: 18 MG/DL — SIGNIFICANT CHANGE UP (ref 7–23)
CALCIUM SERPL-MCNC: 9.7 MG/DL — SIGNIFICANT CHANGE UP (ref 8.4–10.5)
CHLORIDE SERPL-SCNC: 106 MMOL/L — SIGNIFICANT CHANGE UP (ref 96–108)
CO2 SERPL-SCNC: 30 MMOL/L — SIGNIFICANT CHANGE UP (ref 22–31)
CREAT SERPL-MCNC: 0.81 MG/DL — SIGNIFICANT CHANGE UP (ref 0.5–1.3)
CULTURE RESULTS: SIGNIFICANT CHANGE UP
CULTURE RESULTS: SIGNIFICANT CHANGE UP
EOSINOPHIL # BLD AUTO: 0.6 K/UL — HIGH (ref 0–0.5)
EOSINOPHIL NFR BLD AUTO: 5.5 % — SIGNIFICANT CHANGE UP (ref 0–6)
GLUCOSE BLDC GLUCOMTR-MCNC: 196 MG/DL — HIGH (ref 70–99)
GLUCOSE BLDC GLUCOMTR-MCNC: 215 MG/DL — HIGH (ref 70–99)
GLUCOSE BLDC GLUCOMTR-MCNC: 240 MG/DL — HIGH (ref 70–99)
GLUCOSE BLDC GLUCOMTR-MCNC: 253 MG/DL — HIGH (ref 70–99)
GLUCOSE SERPL-MCNC: 200 MG/DL — HIGH (ref 70–99)
HCT VFR BLD CALC: 32.3 % — LOW (ref 34.5–45)
HGB BLD-MCNC: 10.6 G/DL — LOW (ref 11.5–15.5)
LYMPHOCYTES # BLD AUTO: 3.7 K/UL — HIGH (ref 1–3.3)
LYMPHOCYTES # BLD AUTO: 35.8 % — SIGNIFICANT CHANGE UP (ref 13–44)
MCHC RBC-ENTMCNC: 28.1 PG — SIGNIFICANT CHANGE UP (ref 27–34)
MCHC RBC-ENTMCNC: 32.8 GM/DL — SIGNIFICANT CHANGE UP (ref 32–36)
MCV RBC AUTO: 85.6 FL — SIGNIFICANT CHANGE UP (ref 80–100)
MONOCYTES # BLD AUTO: 0.9 K/UL — SIGNIFICANT CHANGE UP (ref 0–0.9)
MONOCYTES NFR BLD AUTO: 8.8 % — SIGNIFICANT CHANGE UP (ref 1–9)
NEUTROPHILS # BLD AUTO: 5 K/UL — SIGNIFICANT CHANGE UP (ref 1.8–7.4)
NEUTROPHILS NFR BLD AUTO: 48.5 % — SIGNIFICANT CHANGE UP (ref 43–77)
PLATELET # BLD AUTO: 193 K/UL — SIGNIFICANT CHANGE UP (ref 150–400)
POTASSIUM SERPL-MCNC: 4.2 MMOL/L — SIGNIFICANT CHANGE UP (ref 3.5–5.3)
POTASSIUM SERPL-SCNC: 4.2 MMOL/L — SIGNIFICANT CHANGE UP (ref 3.5–5.3)
RBC # BLD: 3.78 M/UL — LOW (ref 3.8–5.2)
RBC # FLD: 13.8 % — SIGNIFICANT CHANGE UP (ref 10.3–14.5)
SODIUM SERPL-SCNC: 142 MMOL/L — SIGNIFICANT CHANGE UP (ref 135–145)
SPECIMEN SOURCE: SIGNIFICANT CHANGE UP
SPECIMEN SOURCE: SIGNIFICANT CHANGE UP
WBC # BLD: 10.3 K/UL — SIGNIFICANT CHANGE UP (ref 3.8–10.5)
WBC # FLD AUTO: 10.3 K/UL — SIGNIFICANT CHANGE UP (ref 3.8–10.5)

## 2018-09-04 PROCEDURE — 99233 SBSQ HOSP IP/OBS HIGH 50: CPT

## 2018-09-04 RX ORDER — GLYBURIDE 5 MG
0 TABLET ORAL
Qty: 0 | Refills: 0 | COMMUNITY

## 2018-09-04 RX ORDER — INSULIN GLARGINE 100 [IU]/ML
10 INJECTION, SOLUTION SUBCUTANEOUS
Qty: 100 | Refills: 0 | OUTPATIENT
Start: 2018-09-04

## 2018-09-04 RX ORDER — INSULIN GLARGINE 100 [IU]/ML
10 INJECTION, SOLUTION SUBCUTANEOUS
Qty: 0 | Refills: 0 | COMMUNITY
Start: 2018-09-04

## 2018-09-04 RX ORDER — RANITIDINE HYDROCHLORIDE 150 MG/1
1 TABLET, FILM COATED ORAL
Qty: 0 | Refills: 0 | COMMUNITY

## 2018-09-04 RX ORDER — RANITIDINE HYDROCHLORIDE 150 MG/1
1 TABLET, FILM COATED ORAL
Qty: 0 | Refills: 0 | COMMUNITY
Start: 2018-09-04

## 2018-09-04 RX ORDER — INSULIN LISPRO 100/ML
3 VIAL (ML) SUBCUTANEOUS
Qty: 100 | Refills: 0 | OUTPATIENT
Start: 2018-09-04

## 2018-09-04 RX ADMIN — GABAPENTIN 100 MILLIGRAM(S): 400 CAPSULE ORAL at 06:11

## 2018-09-04 RX ADMIN — INSULIN GLARGINE 10 UNIT(S): 100 INJECTION, SOLUTION SUBCUTANEOUS at 08:49

## 2018-09-04 RX ADMIN — Medication 3 UNIT(S): at 12:31

## 2018-09-04 RX ADMIN — Medication 2: at 08:46

## 2018-09-04 RX ADMIN — Medication 325 MILLIGRAM(S): at 12:31

## 2018-09-04 RX ADMIN — ATORVASTATIN CALCIUM 40 MILLIGRAM(S): 80 TABLET, FILM COATED ORAL at 23:27

## 2018-09-04 RX ADMIN — Medication 3 UNIT(S): at 08:46

## 2018-09-04 RX ADMIN — HEPARIN SODIUM 5000 UNIT(S): 5000 INJECTION INTRAVENOUS; SUBCUTANEOUS at 14:35

## 2018-09-04 RX ADMIN — ESCITALOPRAM OXALATE 10 MILLIGRAM(S): 10 TABLET, FILM COATED ORAL at 12:31

## 2018-09-04 RX ADMIN — Medication 4: at 17:32

## 2018-09-04 RX ADMIN — HEPARIN SODIUM 5000 UNIT(S): 5000 INJECTION INTRAVENOUS; SUBCUTANEOUS at 23:27

## 2018-09-04 RX ADMIN — HEPARIN SODIUM 5000 UNIT(S): 5000 INJECTION INTRAVENOUS; SUBCUTANEOUS at 06:11

## 2018-09-04 RX ADMIN — GABAPENTIN 100 MILLIGRAM(S): 400 CAPSULE ORAL at 17:32

## 2018-09-04 RX ADMIN — Medication 6: at 12:31

## 2018-09-04 RX ADMIN — Medication 40 MILLIGRAM(S): at 06:12

## 2018-09-04 RX ADMIN — Medication 40 MILLIGRAM(S): at 14:31

## 2018-09-04 RX ADMIN — Medication 40 MILLIGRAM(S): at 23:27

## 2018-09-04 RX ADMIN — Medication 3 UNIT(S): at 17:31

## 2018-09-04 RX ADMIN — Medication 300 MILLIGRAM(S): at 12:31

## 2018-09-04 RX ADMIN — PANTOPRAZOLE SODIUM 40 MILLIGRAM(S): 20 TABLET, DELAYED RELEASE ORAL at 08:48

## 2018-09-04 NOTE — DISCHARGE NOTE ADULT - HOSPITAL COURSE
89 yo female from [facility], came in with hyperglycemia, ams, rectal bleeding, admitted to ICU for Hyperosmolar hyperglycemic coma due to diabetes mellitus without ketoacidosis on 8/29/18 88 year old female from [facility], came in with hyperglycemia, ams, rectal bleeding, admitted to ICU for Hyperosmolar hyperglycemic coma due to diabetes mellitus without ketoacidosis on 8/29/18. Patient was in septic shock due to UTI with metabolic Encephelopathy, DONNA and Hyperglycemia now resolved, completed antibiotics. Patient is medically stable for discharge. 88 year old female from [facility], came in with hyperglycemia, ams, rectal bleeding, admitted to ICU for Hyperosmolar hyperglycemic coma due to diabetes mellitus without ketoacidosis on 8/29/18. Patient was in septic shock due to UTI with metabolic Encephelopathy, DONNA and Hyperglycemia now resolved, completed antibiotics. Pt. with urinary retention, Elizondo placed, pt. will be discharged with Elizondo and void trial will be done outpatient. Patient is medically stable for discharge.

## 2018-09-04 NOTE — DISCHARGE NOTE ADULT - CARE PROVIDER_API CALL
James Hart (DO), [address]  [address]  [place] [address]  Phone: [phone]  Fax: [phone]

## 2018-09-04 NOTE — PROGRESS NOTE ADULT - SUBJECTIVE AND OBJECTIVE BOX
CC: f/u for severe sepsis of unknown origin    Patient reports she likes my dress    REVIEW OF SYSTEMS:  All other review of systems negative (Comprehensive ROS)    Antimicrobials Day #  :    Other Medications Reviewed    T(F): 98.4 (09-04-18 @ 05:23), Max: 98.7 (09-03-18 @ 22:44)  HR: 66 (09-04-18 @ 05:23)  BP: 111/69 (09-04-18 @ 05:23)  RR: 14 (09-04-18 @ 05:23)  SpO2: 97% (09-04-18 @ 05:23)  Wt(kg): --    PHYSICAL EXAM:  General: alert, no acute distress  Eyes:  anicteric, no conjunctival injection, no discharge  Oropharynx: no lesions or injection 	  Neck: supple, without adenopathy  Lungs: basilar rales to auscultation  Heart: s1s2 2/6 sys m  Abdomen: soft, nondistended, nontender, without mass or organomegaly  Skin: no lesions  Extremities: no clubbing, cyanosis, or edema  Neurologic: alert,confused, does not move well    LAB RESULTS:                        10.6   10.3  )-----------( 193      ( 04 Sep 2018 06:00 )             32.3     09-04    142  |  106  |  18  ----------------------------<  200<H>  4.2   |  30  |  0.81    Ca    9.7      04 Sep 2018 06:00    TPro  6.0  /  Alb  2.2<L>  /  TBili  0.7  /  DBili  x   /  AST  51<H>  /  ALT  36  /  AlkPhos  77  09-03    LIVER FUNCTIONS - ( 03 Sep 2018 13:55 )  Alb: 2.2 g/dL / Pro: 6.0 g/dL / ALK PHOS: 77 U/L / ALT: 36 U/L DA / AST: 51 U/L / GGT: x             MICROBIOLOGY:  RECENT CULTURES:  Culture - Urine (08.29.18 @ 20:30)    Specimen Source: .Urine Clean Catch (Midstream)    Culture Results:   Culture grew 3 or more types of organisms which indicate  collection contamination; consider recollection only if clinically  indicated.    Culture - Blood (08.29.18 @ 19:00)    Specimen Source: .Blood Blood-Peripheral    Culture Results:   No growth at 5 days.        RADIOLOGY REVIEWED:    < from: CT Head No Cont (08.30.18 @ 11:56) >  Impression:  1. Unremarkable noncontrast CT scan of the brain.          < end of copied text >          < from: Xray Chest 1 View-PORTABLE IMMEDIATE (08.29.18 @ 19:15) >      < end of copied text >    Assessment:  Elderly female from assisted living admitted for increased confusion and found to have leukocytosis, hyperglycemia, elevated lactate and leukocytosis. She improve with supportive care and empiric ceftriaxone but no source of infection specifically found. She is stable now off antibiotics after a 5 days course  Plan:  continue to monitor off antibiotics  No ID objection to discharge today

## 2018-09-04 NOTE — CHART NOTE - NSCHARTNOTEFT_GEN_A_CORE
Telephone call placed to [facility] pharmacy regarding specific time for lantus (lantus 10 units SQ every morning)

## 2018-09-04 NOTE — DISCHARGE NOTE ADULT - MEDICATION SUMMARY - MEDICATIONS TO CHANGE
I will SWITCH the dose or number of times a day I take the medications listed below when I get home from the hospital:  None I will SWITCH the dose or number of times a day I take the medications listed below when I get home from the hospital:    insulin lispro (concentrated) 200 units/mL subcutaneous solution  -- 3 unit(s) subcutaneous 3 times a day (before meals)

## 2018-09-04 NOTE — DISCHARGE NOTE ADULT - PLAN OF CARE
resolved completed course of IV ceftriaxone improved monitor CBC Lower HA1c continue insulin therapy, lantus premeal is to Lower HA1c

## 2018-09-04 NOTE — DISCHARGE NOTE ADULT - MEDICATION SUMMARY - MEDICATIONS TO STOP TAKING
I will STOP taking the medications listed below when I get home from the hospital:    glyBURIDE 5 mg oral tablet  -- orally 2 times a day I will STOP taking the medications listed below when I get home from the hospital:    glyBURIDE 5 mg oral tablet  -- orally 2 times a day    metFORMIN 500 mg oral tablet, extended release  -- 2 tab(s) by mouth once a day

## 2018-09-04 NOTE — DISCHARGE NOTE ADULT - MEDICATION SUMMARY - MEDICATIONS TO TAKE
I will START or STAY ON the medications listed below when I get home from the hospital:    aspirin 325 mg oral tablet  -- 1 tab(s) by mouth once a day  -- Indication: For Heart    propranolol 40 mg oral tablet  -- 1 tab(s) by mouth 3 times a day  -- Indication: For Heart    gabapentin 100 mg oral capsule  -- 2 cap(s) by mouth 2 times a day  -- Indication: For Nerve    escitalopram 10 mg oral tablet  -- 1 tab(s) by mouth once a day  -- Indication: For Depression    metFORMIN 500 mg oral tablet, extended release  -- 2 tab(s) by mouth once a day  -- Indication: For Blood sugar    Lantus 100 units/mL subcutaneous solution  -- 10 unit(s) subcutaneous once a day   -- Do not drink alcoholic beverages when taking this medication.  It is very important that you take or use this exactly as directed.  Do not skip doses or discontinue unless directed by your doctor.  Keep in refrigerator.  Do not freeze.    -- Indication: For Blood sugar    HumaLOG 100 units/mL injectable solution  -- 3 milliliter(s) injectable 3 times a day (before meals)   -- Indication: For Blood sugar    meclizine 25 mg oral tablet  -- 1 tab(s) by mouth 2 times a day, As Needed  -- Indication: For Nausea    allopurinol 300 mg oral tablet  -- 1 tab(s) by mouth once a day  -- Indication: For Gout    cholestyramine 4 g/5 g oral powder for reconstitution  -- Indication: For cholesterol    Lipitor 40 mg oral tablet  -- 1 tab(s) by mouth once a day  -- Indication: For cholesterol    valsartan-hydrochlorothiazide 320mg-12.5mg oral tablet  -- 1 tab(s) by mouth once a day  -- Indication: For Blood presure    raNITIdine 150 mg oral tablet  -- 1 tab(s) by mouth 2 times a day  -- Indication: For stomach    Protonix 40 mg oral delayed release tablet  -- 1 tab(s) by mouth once a day  -- Indication: For stomach I will START or STAY ON the medications listed below when I get home from the hospital:    aspirin 325 mg oral tablet  -- 1 tab(s) by mouth once a day  -- Indication: For Prophylaxis     propranolol 40 mg oral tablet  -- 1 tab(s) by mouth 3 times a day  -- Indication: For HTN (hypertension)    gabapentin 100 mg oral capsule  -- 2 cap(s) by mouth 2 times a day  -- Indication: For Neuropatic pain     escitalopram 10 mg oral tablet  -- 1 tab(s) by mouth once a day  -- Indication: For Depression    insulin lispro (concentrated) 200 units/mL subcutaneous solution  -- 3 unit(s) subcutaneous 3 times a day (before meals)   -- Indication: For T2DM    Lantus 100 units/mL subcutaneous solution  -- 19 unit(s) subcutaneous once a day (in the morning)   -- Do not drink alcoholic beverages when taking this medication.  It is very important that you take or use this exactly as directed.  Do not skip doses or discontinue unless directed by your doctor.  Keep in refrigerator.  Do not freeze.    -- Indication: For T2DM    metFORMIN 500 mg oral tablet, extended release  -- 2 tab(s) by mouth once a day  -- Indication: For T2DM    meclizine 25 mg oral tablet  -- 1 tab(s) by mouth 2 times a day, As Needed  -- Indication: For Vertigo    allopurinol 300 mg oral tablet  -- 1 tab(s) by mouth once a day  -- Indication: For Gout    cholestyramine 4 g/5 g oral powder for reconstitution  -- Indication: For cholesterol    Lipitor 40 mg oral tablet  -- 1 tab(s) by mouth once a day  -- Indication: For cholesterol    valsartan-hydrochlorothiazide 320mg-12.5mg oral tablet  -- 1 tab(s) by mouth once a day  -- Indication: For Blood presure    raNITIdine 150 mg oral tablet  -- 1 tab(s) by mouth 2 times a day  -- Indication: For GERD (gastroesophageal reflux disease)    senna oral tablet  -- 2 tab(s) by mouth once a day (at bedtime)  -- Indication: For Constipation     docusate sodium 100 mg oral capsule  -- 1 cap(s) by mouth 2 times a day  -- Indication: For Constipation     Protonix 40 mg oral delayed release tablet  -- 1 tab(s) by mouth once a day  -- Indication: For GERD (gastroesophageal reflux disease) I will START or STAY ON the medications listed below when I get home from the hospital:    aspirin 325 mg oral tablet  -- 1 tab(s) by mouth once a day  -- Indication: For Prophylaxis     propranolol 40 mg oral tablet  -- 1 tab(s) by mouth 3 times a day  -- Indication: For HTN (hypertension)    gabapentin 100 mg oral capsule  -- 2 cap(s) by mouth 2 times a day  -- Indication: For Neuropatic pain     escitalopram 10 mg oral tablet  -- 1 tab(s) by mouth once a day  -- Indication: For Depression    insulin lispro (concentrated) 200 units/mL subcutaneous solution  -- 6 unit(s) subcutaneous 3 times a day (before meals)   -- Indication: For T2DM    Lantus 100 units/mL subcutaneous solution  -- 19 unit(s) subcutaneous once a day (in the morning)   -- Do not drink alcoholic beverages when taking this medication.  It is very important that you take or use this exactly as directed.  Do not skip doses or discontinue unless directed by your doctor.  Keep in refrigerator.  Do not freeze.    -- Indication: For T2DM    meclizine 25 mg oral tablet  -- 1 tab(s) by mouth 2 times a day, As Needed  -- Indication: For Vertigo    allopurinol 300 mg oral tablet  -- 1 tab(s) by mouth once a day  -- Indication: For Gout    cholestyramine 4 g/5 g oral powder for reconstitution  -- Indication: For cholesterol    Lipitor 40 mg oral tablet  -- 1 tab(s) by mouth once a day  -- Indication: For cholesterol    valsartan-hydrochlorothiazide 320mg-12.5mg oral tablet  -- 1 tab(s) by mouth once a day  -- Indication: For Blood presure    raNITIdine 150 mg oral tablet  -- 1 tab(s) by mouth 2 times a day  -- Indication: For GERD (gastroesophageal reflux disease)    senna oral tablet  -- 2 tab(s) by mouth once a day (at bedtime)  -- Indication: For Constipation     docusate sodium 100 mg oral capsule  -- 1 cap(s) by mouth 2 times a day  -- Indication: For Constipation     Protonix 40 mg oral delayed release tablet  -- 1 tab(s) by mouth once a day  -- Indication: For GERD (gastroesophageal reflux disease)

## 2018-09-04 NOTE — DISCHARGE NOTE ADULT - CARE PLAN
Principal Discharge DX:	Urinary tract infection without hematuria, site unspecified  Goal:	resolved  Assessment and plan of treatment:	completed course of IV ceftriaxone  Secondary Diagnosis:	Metabolic encephalopathy  Assessment and plan of treatment:	improved  Secondary Diagnosis:	Blood in stool  Goal:	monitor CBC  Secondary Diagnosis:	Hyperosmolar hyperglycemic coma due to diabetes mellitus without ketoacidosis  Goal:	Lower HA1c  Assessment and plan of treatment:	continue insulin therapy, lantus premeal  Secondary Diagnosis:	Diabetes Principal Discharge DX:	Urinary tract infection without hematuria, site unspecified  Goal:	resolved  Assessment and plan of treatment:	completed course of IV ceftriaxone  Secondary Diagnosis:	Metabolic encephalopathy  Assessment and plan of treatment:	improved  Secondary Diagnosis:	Blood in stool  Goal:	monitor CBC  Secondary Diagnosis:	Hyperosmolar hyperglycemic coma due to diabetes mellitus without ketoacidosis  Goal:	is to Lower HA1c  Assessment and plan of treatment:	continue insulin therapy, lantus premeal  Secondary Diagnosis:	Diabetes

## 2018-09-04 NOTE — PROGRESS NOTE ADULT - SUBJECTIVE AND OBJECTIVE BOX
Patient is a 88y old  Female who presents with a chief complaint of hyperglycemia / UTI      Patient seen and examined at bedside.    ALLERGIES:  penicillin (Unknown)  predniSONE (Unknown)    MEDICATIONS  (STANDING):  allopurinol 300 milliGRAM(s) Oral daily  aspirin 325 milliGRAM(s) Oral daily  atorvastatin 40 milliGRAM(s) Oral at bedtime  dextrose 5%. 1000 milliLiter(s) (50 mL/Hr) IV Continuous <Continuous>  dextrose 50% Injectable 12.5 Gram(s) IV Push once  dextrose 50% Injectable 25 Gram(s) IV Push once  dextrose 50% Injectable 25 Gram(s) IV Push once  escitalopram 10 milliGRAM(s) Oral daily  gabapentin 100 milliGRAM(s) Oral two times a day  heparin  Injectable 5000 Unit(s) SubCutaneous every 8 hours  insulin glargine Injectable (LANTUS) 10 Unit(s) SubCutaneous every morning  insulin lispro (HumaLOG) corrective regimen sliding scale   SubCutaneous three times a day before meals  insulin lispro (HumaLOG) corrective regimen sliding scale   SubCutaneous at bedtime  insulin lispro Injectable (HumaLOG) 3 Unit(s) SubCutaneous three times a day with meals  pantoprazole    Tablet 40 milliGRAM(s) Oral before breakfast  propranolol 40 milliGRAM(s) Oral three times a day    MEDICATIONS  (PRN):  dextrose 40% Gel 15 Gram(s) Oral once PRN Blood Glucose LESS THAN 70 milliGRAM(s)/deciliter  glucagon  Injectable 1 milliGRAM(s) IntraMuscular once PRN Glucose LESS THAN 70 milligrams/deciliter  meclizine 25 milliGRAM(s) Oral two times a day PRN Dizziness    Vital Signs Last 24 Hrs  T(F): 98.4 (04 Sep 2018 05:23), Max: 98.7 (03 Sep 2018 22:44)  HR: 66 (04 Sep 2018 05:23) (65 - 66)  BP: 111/69 (04 Sep 2018 05:23) (111/69 - 169/69)  RR: 14 (04 Sep 2018 05:23) (14 - 14)  SpO2: 97% (04 Sep 2018 05:23) (95% - 98%)  I&O's Summary    03 Sep 2018 07:01  -  04 Sep 2018 07:00  --------------------------------------------------------  IN: 1080 mL / OUT: 0 mL / NET: 1080 mL    04 Sep 2018 07:01  -  04 Sep 2018 11:53  --------------------------------------------------------  IN: 360 mL / OUT: 0 mL / NET: 360 mL    PHYSICAL EXAM:  General: NAD, A/O x 3  ENT: MMM  Neck: Supple, No JVD  Lungs: Clear to auscultation bilaterally  Cardio: RRR, S1/S2, No murmurs  Abdomen: Soft, Nontender, Nondistended; Bowel sounds present  Extremities: No calf tenderness, No pitting edema    LABS:                        10.6   10.3  )-----------( 193      ( 04 Sep 2018 06:00 )             32.3       09-04    142  |  106  |  18  ----------------------------<  200  4.2   |  30  |  0.81    Ca    9.7      04 Sep 2018 06:00    TPro  6.0  /  Alb  2.2  /  TBili  0.7  /  DBili  x   /  AST  51  /  ALT  36  /  AlkPhos  77  09-03     eGFR if Non African American: 65 mL/min/1.73M2 (09-04-18 @ 06:00)  eGFR if African American: 75 mL/min/1.73M2 (09-04-18 @ 06:00)       CAPILLARY BLOOD GLUCOSE  POCT Blood Glucose.: 253 mg/dL (04 Sep 2018 11:46)  POCT Blood Glucose.: 196 mg/dL (04 Sep 2018 07:43)  POCT Blood Glucose.: 233 mg/dL (03 Sep 2018 22:49)  POCT Blood Glucose.: 223 mg/dL (03 Sep 2018 17:13)  POCT Blood Glucose.: 263 mg/dL (03 Sep 2018 11:56)        08-31 [id number] 11.7      RADIOLOGY & ADDITIONAL TESTS:   from: CT Head No Cont (08.30.18 @ 11:56)   Impression:  1. Unremarkable noncontrast CT scan of the brain.          Care Discussed with Consultants/Other Providers: Dr. Edmondson Patient is a 88 year old  Female who presents with a chief complaint of hyperglycemia / UTI      Patient seen and examined at bedside.    ALLERGIES:  penicillin (Unknown)  predniSONE (Unknown)    MEDICATIONS  (STANDING):  allopurinol 300 milliGRAM(s) Oral daily  aspirin 325 milliGRAM(s) Oral daily  atorvastatin 40 milliGRAM(s) Oral at bedtime  dextrose 5%. 1000 milliLiter(s) (50 mL/Hr) IV Continuous <Continuous>  dextrose 50% Injectable 12.5 Gram(s) IV Push once  dextrose 50% Injectable 25 Gram(s) IV Push once  dextrose 50% Injectable 25 Gram(s) IV Push once  escitalopram 10 milliGRAM(s) Oral daily  gabapentin 100 milliGRAM(s) Oral two times a day  heparin  Injectable 5000 Unit(s) SubCutaneous every 8 hours  insulin glargine Injectable (LANTUS) 10 Unit(s) SubCutaneous every morning  insulin lispro (HumaLOG) corrective regimen sliding scale   SubCutaneous three times a day before meals  insulin lispro (HumaLOG) corrective regimen sliding scale   SubCutaneous at bedtime  insulin lispro Injectable (HumaLOG) 3 Unit(s) SubCutaneous three times a day with meals  pantoprazole    Tablet 40 milliGRAM(s) Oral before breakfast  propranolol 40 milliGRAM(s) Oral three times a day    MEDICATIONS  (PRN):  dextrose 40% Gel 15 Gram(s) Oral once PRN Blood Glucose LESS THAN 70 milliGRAM(s)/deciliter  glucagon  Injectable 1 milliGRAM(s) IntraMuscular once PRN Glucose LESS THAN 70 milligrams/deciliter  meclizine 25 milliGRAM(s) Oral two times a day PRN Dizziness    Vital Signs Last 24 Hrs  T(F): 98.4 (04 Sep 2018 05:23), Max: 98.7 (03 Sep 2018 22:44)  HR: 66 (04 Sep 2018 05:23) (65 - 66)  BP: 111/69 (04 Sep 2018 05:23) (111/69 - 169/69)  RR: 14 (04 Sep 2018 05:23) (14 - 14)  SpO2: 97% (04 Sep 2018 05:23) (95% - 98%)  I&O's Summary    03 Sep 2018 07:01  -  04 Sep 2018 07:00  --------------------------------------------------------  IN: 1080 mL / OUT: 0 mL / NET: 1080 mL    04 Sep 2018 07:01  -  04 Sep 2018 11:53  --------------------------------------------------------  IN: 360 mL / OUT: 0 mL / NET: 360 mL    PHYSICAL EXAM:  General: NAD, A/O x 3  ENT: MMM  Neck: Supple, No JVD  Lungs: Clear to auscultation bilaterally  Cardio: RRR, S1/S2, No murmurs  Abdomen: Soft, Nontender, Nondistended; Bowel sounds present  Extremities: No calf tenderness, No pitting edema    LABS:                        10.6   10.3  )-----------( 193      ( 04 Sep 2018 06:00 )             32.3       09-04    142  |  106  |  18  ----------------------------<  200  4.2   |  30  |  0.81    Ca    9.7      04 Sep 2018 06:00    TPro  6.0  /  Alb  2.2  /  TBili  0.7  /  DBili  x   /  AST  51  /  ALT  36  /  AlkPhos  77  09-03     eGFR if Non African American: 65 mL/min/1.73M2 (09-04-18 @ 06:00)  eGFR if African American: 75 mL/min/1.73M2 (09-04-18 @ 06:00)       CAPILLARY BLOOD GLUCOSE  POCT Blood Glucose.: 253 mg/dL (04 Sep 2018 11:46)  POCT Blood Glucose.: 196 mg/dL (04 Sep 2018 07:43)  POCT Blood Glucose.: 233 mg/dL (03 Sep 2018 22:49)  POCT Blood Glucose.: 223 mg/dL (03 Sep 2018 17:13)  POCT Blood Glucose.: 263 mg/dL (03 Sep 2018 11:56)        08-31 [id number] 11.7      RADIOLOGY & ADDITIONAL TESTS:   from: CT Head No Cont (08.30.18 @ 11:56)   Impression:  1. Unremarkable noncontrast CT scan of the brain.          Care Discussed with Consultants/Other Providers: Dr. Edmondson

## 2018-09-04 NOTE — DISCHARGE NOTE ADULT - SECONDARY DIAGNOSIS.
Metabolic encephalopathy Blood in stool Hyperosmolar hyperglycemic coma due to diabetes mellitus without ketoacidosis Diabetes

## 2018-09-05 DIAGNOSIS — R33.9 RETENTION OF URINE, UNSPECIFIED: ICD-10-CM

## 2018-09-05 LAB
APPEARANCE UR: CLEAR — SIGNIFICANT CHANGE UP
BACTERIA # UR AUTO: ABNORMAL /HPF
BILIRUB UR-MCNC: NEGATIVE — SIGNIFICANT CHANGE UP
BUN SERPL-MCNC: 23 MG/DL — SIGNIFICANT CHANGE UP (ref 7–23)
CALCIUM SERPL-MCNC: 10 MG/DL — SIGNIFICANT CHANGE UP (ref 8.4–10.5)
CHLORIDE SERPL-SCNC: 101 MMOL/L — SIGNIFICANT CHANGE UP (ref 96–108)
CO2 SERPL-SCNC: 29 MMOL/L — SIGNIFICANT CHANGE UP (ref 22–31)
COLOR SPEC: YELLOW — SIGNIFICANT CHANGE UP
CREAT SERPL-MCNC: 0.95 MG/DL — SIGNIFICANT CHANGE UP (ref 0.5–1.3)
DIFF PNL FLD: ABNORMAL
EPI CELLS # UR: SIGNIFICANT CHANGE UP
GLUCOSE BLDC GLUCOMTR-MCNC: 236 MG/DL — HIGH (ref 70–99)
GLUCOSE BLDC GLUCOMTR-MCNC: 259 MG/DL — HIGH (ref 70–99)
GLUCOSE BLDC GLUCOMTR-MCNC: 266 MG/DL — HIGH (ref 70–99)
GLUCOSE BLDC GLUCOMTR-MCNC: 310 MG/DL — HIGH (ref 70–99)
GLUCOSE SERPL-MCNC: 358 MG/DL — HIGH (ref 70–99)
GLUCOSE UR QL: 1000 MG/DL
HCT VFR BLD CALC: 32.6 % — LOW (ref 34.5–45)
HGB BLD-MCNC: 10.8 G/DL — LOW (ref 11.5–15.5)
KETONES UR-MCNC: ABNORMAL
LEUKOCYTE ESTERASE UR-ACNC: ABNORMAL
MCHC RBC-ENTMCNC: 28.8 PG — SIGNIFICANT CHANGE UP (ref 27–34)
MCHC RBC-ENTMCNC: 33.1 GM/DL — SIGNIFICANT CHANGE UP (ref 32–36)
MCV RBC AUTO: 87 FL — SIGNIFICANT CHANGE UP (ref 80–100)
NITRITE UR-MCNC: NEGATIVE — SIGNIFICANT CHANGE UP
PH UR: 6 — SIGNIFICANT CHANGE UP (ref 5–8)
PLATELET # BLD AUTO: 191 K/UL — SIGNIFICANT CHANGE UP (ref 150–400)
POTASSIUM SERPL-MCNC: 4.2 MMOL/L — SIGNIFICANT CHANGE UP (ref 3.5–5.3)
POTASSIUM SERPL-SCNC: 4.2 MMOL/L — SIGNIFICANT CHANGE UP (ref 3.5–5.3)
PROT UR-MCNC: 30 MG/DL
RBC # BLD: 3.75 M/UL — LOW (ref 3.8–5.2)
RBC # FLD: 14.6 % — HIGH (ref 10.3–14.5)
RBC CASTS # UR COMP ASSIST: >50 /HPF (ref 0–4)
SODIUM SERPL-SCNC: 134 MMOL/L — LOW (ref 135–145)
SP GR SPEC: 1.01 — SIGNIFICANT CHANGE UP (ref 1.01–1.02)
UROBILINOGEN FLD QL: 1
WBC # BLD: 10.4 K/UL — SIGNIFICANT CHANGE UP (ref 3.8–10.5)
WBC # FLD AUTO: 10.4 K/UL — SIGNIFICANT CHANGE UP (ref 3.8–10.5)
WBC UR QL: SIGNIFICANT CHANGE UP /HPF (ref 0–5)

## 2018-09-05 PROCEDURE — 99233 SBSQ HOSP IP/OBS HIGH 50: CPT

## 2018-09-05 PROCEDURE — 74019 RADEX ABDOMEN 2 VIEWS: CPT | Mod: 26

## 2018-09-05 RX ORDER — DOCUSATE SODIUM 100 MG
100 CAPSULE ORAL
Qty: 0 | Refills: 0 | Status: DISCONTINUED | OUTPATIENT
Start: 2018-09-05 | End: 2018-09-06

## 2018-09-05 RX ORDER — POLYETHYLENE GLYCOL 3350 17 G/17G
17 POWDER, FOR SOLUTION ORAL DAILY
Qty: 0 | Refills: 0 | Status: DISCONTINUED | OUTPATIENT
Start: 2018-09-05 | End: 2018-09-06

## 2018-09-05 RX ORDER — SENNA PLUS 8.6 MG/1
2 TABLET ORAL AT BEDTIME
Qty: 0 | Refills: 0 | Status: DISCONTINUED | OUTPATIENT
Start: 2018-09-05 | End: 2018-09-06

## 2018-09-05 RX ADMIN — Medication 2: at 21:45

## 2018-09-05 RX ADMIN — Medication 40 MILLIGRAM(S): at 16:39

## 2018-09-05 RX ADMIN — Medication 325 MILLIGRAM(S): at 12:37

## 2018-09-05 RX ADMIN — ESCITALOPRAM OXALATE 10 MILLIGRAM(S): 10 TABLET, FILM COATED ORAL at 12:37

## 2018-09-05 RX ADMIN — GABAPENTIN 100 MILLIGRAM(S): 400 CAPSULE ORAL at 06:13

## 2018-09-05 RX ADMIN — HEPARIN SODIUM 5000 UNIT(S): 5000 INJECTION INTRAVENOUS; SUBCUTANEOUS at 06:13

## 2018-09-05 RX ADMIN — INSULIN GLARGINE 10 UNIT(S): 100 INJECTION, SOLUTION SUBCUTANEOUS at 08:44

## 2018-09-05 RX ADMIN — Medication 3 UNIT(S): at 08:42

## 2018-09-05 RX ADMIN — HEPARIN SODIUM 5000 UNIT(S): 5000 INJECTION INTRAVENOUS; SUBCUTANEOUS at 21:45

## 2018-09-05 RX ADMIN — Medication 40 MILLIGRAM(S): at 21:45

## 2018-09-05 RX ADMIN — Medication 6: at 12:37

## 2018-09-05 RX ADMIN — Medication 40 MILLIGRAM(S): at 06:13

## 2018-09-05 RX ADMIN — POLYETHYLENE GLYCOL 3350 17 GRAM(S): 17 POWDER, FOR SOLUTION ORAL at 16:39

## 2018-09-05 RX ADMIN — GABAPENTIN 100 MILLIGRAM(S): 400 CAPSULE ORAL at 17:24

## 2018-09-05 RX ADMIN — SENNA PLUS 2 TABLET(S): 8.6 TABLET ORAL at 21:45

## 2018-09-05 RX ADMIN — Medication 8: at 17:24

## 2018-09-05 RX ADMIN — HEPARIN SODIUM 5000 UNIT(S): 5000 INJECTION INTRAVENOUS; SUBCUTANEOUS at 16:39

## 2018-09-05 RX ADMIN — Medication 4: at 08:42

## 2018-09-05 RX ADMIN — ATORVASTATIN CALCIUM 40 MILLIGRAM(S): 80 TABLET, FILM COATED ORAL at 21:45

## 2018-09-05 RX ADMIN — Medication 3 UNIT(S): at 17:24

## 2018-09-05 RX ADMIN — PANTOPRAZOLE SODIUM 40 MILLIGRAM(S): 20 TABLET, DELAYED RELEASE ORAL at 06:13

## 2018-09-05 RX ADMIN — Medication 300 MILLIGRAM(S): at 12:37

## 2018-09-05 RX ADMIN — Medication 100 MILLIGRAM(S): at 17:25

## 2018-09-05 RX ADMIN — Medication 3 UNIT(S): at 12:37

## 2018-09-05 NOTE — PROGRESS NOTE ADULT - SUBJECTIVE AND OBJECTIVE BOX
Patient is a 88 year old  Female who presents with a chief complaint of hyperglycemia / UTI (04 Sep 2018 11:52)    Patient seen and examined. Called by nurse to see patient for dark urine in diaper      MEDICATIONS  (STANDING):  allopurinol 300 milliGRAM(s) Oral daily  aspirin 325 milliGRAM(s) Oral daily  atorvastatin 40 milliGRAM(s) Oral at bedtime  dextrose 5%. 1000 milliLiter(s) (50 mL/Hr) IV Continuous <Continuous>  dextrose 50% Injectable 12.5 Gram(s) IV Push once  dextrose 50% Injectable 25 Gram(s) IV Push once  dextrose 50% Injectable 25 Gram(s) IV Push once  escitalopram 10 milliGRAM(s) Oral daily  gabapentin 100 milliGRAM(s) Oral two times a day  heparin  Injectable 5000 Unit(s) SubCutaneous every 8 hours  insulin glargine Injectable (LANTUS) 10 Unit(s) SubCutaneous every morning  insulin lispro (HumaLOG) corrective regimen sliding scale   SubCutaneous three times a day before meals  insulin lispro (HumaLOG) corrective regimen sliding scale   SubCutaneous at bedtime  insulin lispro Injectable (HumaLOG) 3 Unit(s) SubCutaneous three times a day with meals  pantoprazole    Tablet 40 milliGRAM(s) Oral before breakfast  propranolol 40 milliGRAM(s) Oral three times a day    MEDICATIONS  (PRN):  dextrose 40% Gel 15 Gram(s) Oral once PRN Blood Glucose LESS THAN 70 milliGRAM(s)/deciliter  glucagon  Injectable 1 milliGRAM(s) IntraMuscular once PRN Glucose LESS THAN 70 milligrams/deciliter  meclizine 25 milliGRAM(s) Oral two times a day PRN Dizziness    REVIEW OF SYSTEMS:  CONSTITUTIONAL: No fever, weight loss. has fatigue  EYES: No eye pain, visual disturbances, or discharge  ENMT:  No difficulty hearing, tinnitus, vertigo; No sinus or throat pain  NECK: No pain or stiffness  RESPIRATORY: No cough, wheezing, chills or hemoptysis; No shortness of breath  CARDIOVASCULAR: No chest pain, palpitations, dizziness, or leg swelling  GASTROINTESTINAL: No abdominal or epigastric pain. No nausea, vomiting, or hematemesis; No diarrhea. has constipation. no melena or hematochezia.  GENITOURINARY: Dark urine, urinary retention. No dysuria, frequency, hematuria, or incontinence  NEUROLOGICAL: No headaches, memory loss, loss of strength, numbness, or tremors  SKIN: No itching, burning, rashes, or lesions   ENDOCRINE: No heat or cold intolerance; No hair loss  MUSCULOSKELETAL: No joint pain or swelling; No muscle, back, or extremity pain  PSYCHIATRIC: No depression, anxiety, mood swings, or difficulty sleeping  HEME/LYMPH: No easy bruising, or bleeding gums  ALLERGY AND IMMUNOLOGIC: No hives or eczema      PHYSICAL EXAM:  T(C): 36.8 (09-05-18 @ 05:37), Max: 36.8 (09-05-18 @ 05:37)  HR: 58 (09-05-18 @ 05:37) (58 - 74)  BP: 171/57 (09-05-18 @ 05:37) (163/64 - 171/57)  RR: 14 (09-05-18 @ 05:37) (14 - 14)  SpO2: 94% (09-05-18 @ 05:37) (94% - 94%)    I&O's Summary    04 Sep 2018 07:01  -  05 Sep 2018 07:00  --------------------------------------------------------  IN: 720 mL / OUT: 0 mL / NET: 720 mL      GENERAL: Elderly female, NAD, well-groomed, well-developed  HEAD:  Atraumatic, Normocephalic  EYES: EOMI, PERRL, conjunctiva and sclera clear  ENMT: Moist mucous membranes, Good dentition, No lesions  NECK: Supple, No JVD, Normal thyroid  NERVOUS SYSTEM:  Alert & Oriented X3, nonfocal, follows commands  CHEST/LUNG: Clear to ascultation bilaterally; No rales, rhonchi, wheezing, or rubs  HEART: Regular rate and rhythm; murmurs, no rubs, or gallops  ABDOMEN: Soft, Nontender, Nondistended; Bowel sounds present  : straight cath showed dark urine coming up to 2L  EXTREMITIES:  2+ Peripheral Pulses, No clubbing, cyanosis, or edema  PSYCH: calm  SKIN: No rashes or lesions      LABS:                        10.6   10.3  )-----------( 193      ( 04 Sep 2018 06:00 )             32.3     09-04    142  |  106  |  18  ----------------------------<  200<H>  4.2   |  30  |  0.81    Ca    9.7      04 Sep 2018 06:00          CAPILLARY BLOOD GLUCOSE  POCT Blood Glucose.: 266 mg/dL (05 Sep 2018 11:53)  POCT Blood Glucose.: 236 mg/dL (05 Sep 2018 07:46)  POCT Blood Glucose.: 240 mg/dL (04 Sep 2018 21:14)  POCT Blood Glucose.: 215 mg/dL (04 Sep 2018 16:53)            RADIOLOGY & ADDITIONAL TESTS:    Imaging Personally Reviewed:  [x] YES  [ ] NO    Consultant(s) Notes Reviewed:  [x] YES  [ ] NO    Care Discussed with Consultants/Other Providers [x] YES  [ ] NO

## 2018-09-05 NOTE — CHART NOTE - NSCHARTNOTEFT_GEN_A_CORE
Telephone call to  office for urology consult for urinary retention (1800 ml) requiring bennett catheter placement

## 2018-09-05 NOTE — PROVIDER CONTACT NOTE (OTHER) - ASSESSMENT
Upon changing diaper today, noted dark black urination. denies pain, or discomfort upon urination. Pt did not have any bowel movement since august 29th.

## 2018-09-05 NOTE — PROGRESS NOTE ADULT - SUBJECTIVE AND OBJECTIVE BOX
CC: f/u for  leukocytosis  Patient reports she feels well today    REVIEW OF SYSTEMS:  All other review of systems negative (Comprehensive ROS)    Antimicrobials Day #  :    Other Medications Reviewed    T(F): 97.8 (09-05-18 @ 15:36), Max: 98.2 (09-05-18 @ 05:37)  HR: 72 (09-05-18 @ 15:36)  BP: 142/54 (09-05-18 @ 15:36)  RR: 14 (09-05-18 @ 15:36)  SpO2: 97% (09-05-18 @ 15:36)  Wt(kg): --    PHYSICAL EXAM:  General: alert, no acute distress  Eyes:  anicteric, no conjunctival injection, no discharge  Oropharynx: no lesions or injection 	  Neck: supple, without adenopathy  Lungs: clear to auscultation  Heart: regular rate and rhythm; no murmur, rubs or gallops  Abdomen: soft, nondistended, nontender, without mass or organomegaly  Skin: no lesions  Extremities: no clubbing, cyanosis, or edema  Neurologic: alert,  moves all extremities    LAB RESULTS:                        10.8   10.4  )-----------( 191      ( 05 Sep 2018 15:39 )             32.6     09-05    134<L>  |  101  |  23  ----------------------------<  358<H>  4.2   |  29  |  0.95    Ca    10.0      05 Sep 2018 15:39          MICROBIOLOGY:  RECENT CULTURES:      RADIOLOGY REVIEWED:  < from: Xray Abdomen 2 Views (09.05.18 @ 16:21) >  EXAM:  XR ABDOMEN 2V      PROCEDURE DATE:  09/05/2018        INTERPRETATION:  CLINICAL INDICATION:  Constipation    COMPARISON:  None    TECHNIQUE:  Supine and upright radiographs of the abdomen performed.    FINDINGS:  Fecal material is scattered throughout the colon. There is no   evidence for mechanical bowel obstruction. No free intraperitoneal air is   identified. Surgical clips are identified within the right upper   quadrant, likely related to prior cholecystectomy. Degenerative changes   of the lumbar spine noted.    IMPRESSION:  As above.    < end of copied text >              Assessment:  Patient admitted with hyperglycemia to 1000, lethargy, leukocytosis improved with [facility] tx and empiric ceftriaxone with no specific infection found. Today she was noted to be in severe urinary retention so a bennett was placed. She has black urine I suspect from blood.   Plan:  Monitor off antibiotics  await urology evaluation  check urinalysis   check urine myoglobin  monitor for hemolysis , check g6pd but I highly doubt she has 'black water fever'

## 2018-09-05 NOTE — PROGRESS NOTE ADULT - PROBLEM SELECTOR PLAN 7
insulin adjusted, Lantus, pre meal insulin,  and insulin ss  a1c 11.7%  Glycemic control with insulin
tylenol prn  f/u PT
insulin adjusted, Lantus, pre meal insulin,  and insulin ss  a1c 11.7%  Glycemic control with insulin
tylenol prn  f/u PT

## 2018-09-05 NOTE — PROGRESS NOTE ADULT - PROBLEM SELECTOR PLAN 1
straight cath showed dark urine coming up to 2L, Elizondo placed  f/u labs, abd xray  urology consult

## 2018-09-05 NOTE — CHART NOTE - NSCHARTNOTEFT_GEN_A_CORE
NUTRITION FOLLOW UP    SOURCE: Patient [)   Family [ ]     other [x ]    DIET: Consistent cho dysphagia 2 glucerna shakes 8 oz. tid    PATIENT REPORT[ ] nausea  [ ] vomiting [ ] diarrhea [ ] constipation  [ ]chewing problems [ ] swallowing issues  [ ] other: no GI distress    PO INTAKE:  < 50% [ ]   50-75%  [X ]   %  []  other :    SOURCE: for PO intake [] Patient [ ] family [X ] chart [ ] staff [ ] other    ENTERAL/PARENTERAL NUTRITION: n/a    CURRENT WEIGHT: Weight (kg): 106.4     PERTINENT LABS:  Date: 04 Sep 2018 06:00  Hemoglobin 10.6   Hematocrit 32.3     Date: 09-04  Sodium 142  Potassium 4.2  Glucose Serum 200<H>  BUN 18      Creatinine    ACCUCHECK  POCT Blood Glucose.: 236 mg/dL (05 Sep 2018 07:46)  POCT Blood Glucose.: 240 mg/dL (04 Sep 2018 21:14)  POCT Blood Glucose.: 215 mg/dL (04 Sep 2018 16:53)  POCT Blood Glucose.: 253 mg/dL (04 Sep 2018 11:46)    NEGATIVE EDEMA  SKIN: SKIN INTACT    ESTIMATED NEEDS:   [X] no change since previous assessment  [ ] recalculated:     PREVIOUS NUTRITION DIAGNOSIS: addressed    NUTRITION DIAGNOSIS is   [X] resolved, RD will follow as per nutrition department protocol.    NEW NUTRITION DIAGNOSIS: [X] not applicable    MONITORING AND EVALUATION:   Current diet order is appropriate and is well tolerated, but will monitor for any changes that may be needed    [X] PO intake [X] Tolerance to diet prescription [X] weights [X] follow up per protocol    NUTRITION RECOMMENDATIONS:    cONTINUE TO MONITOR PO INTAKE. eNCOURAGE PO SUPPLEMENTS DAILY.

## 2018-09-05 NOTE — CHART NOTE - NSCHARTNOTEFT_GEN_A_CORE
Patient seen and examined. Denies any complaints. all ROS are negative  GENERAL: Elderly female, NAD, well-groomed  HEAD:  Atraumatic, Normocephalic  EYES: EOMI, PERRL, conjunctiva and sclera clear  ENMT: Moist mucous membranes, Good dentition, No lesions  NECK: Supple, No JVD, Normal thyroid  NERVOUS SYSTEM:  Alert & Oriented X3, nonfocal, follows commands  CHEST/LUNG: Clear to ascultation bilaterally; No rales, rhonchi, wheezing, or rubs  HEART: Regular rate and rhythm; murmurs, no rubs, or gallops  ABDOMEN: Soft, Nontender, Nondistended; Bowel sounds present  EXTREMITIES:  2+ Peripheral Pulses, No clubbing, cyanosis, or edema  PSYCH: calm  SKIN: No rashes or lesions  See discharge note for details. Patient is medically stable for discharge.  9/5/2018

## 2018-09-06 VITALS
RESPIRATION RATE: 14 BRPM | DIASTOLIC BLOOD PRESSURE: 64 MMHG | TEMPERATURE: 99 F | OXYGEN SATURATION: 96 % | SYSTOLIC BLOOD PRESSURE: 153 MMHG | HEART RATE: 78 BPM

## 2018-09-06 DIAGNOSIS — K21.9 GASTRO-ESOPHAGEAL REFLUX DISEASE WITHOUT ESOPHAGITIS: ICD-10-CM

## 2018-09-06 DIAGNOSIS — R31.0 GROSS HEMATURIA: ICD-10-CM

## 2018-09-06 LAB
ANION GAP SERPL CALC-SCNC: 6 MMOL/L — SIGNIFICANT CHANGE UP (ref 5–17)
BUN SERPL-MCNC: 16 MG/DL — SIGNIFICANT CHANGE UP (ref 7–23)
CALCIUM SERPL-MCNC: 9.9 MG/DL — SIGNIFICANT CHANGE UP (ref 8.4–10.5)
CHLORIDE SERPL-SCNC: 105 MMOL/L — SIGNIFICANT CHANGE UP (ref 96–108)
CO2 SERPL-SCNC: 32 MMOL/L — HIGH (ref 22–31)
CREAT SERPL-MCNC: 0.88 MG/DL — SIGNIFICANT CHANGE UP (ref 0.5–1.3)
CULTURE RESULTS: NO GROWTH — SIGNIFICANT CHANGE UP
GLUCOSE BLDC GLUCOMTR-MCNC: 190 MG/DL — HIGH (ref 70–99)
GLUCOSE BLDC GLUCOMTR-MCNC: 223 MG/DL — HIGH (ref 70–99)
GLUCOSE BLDC GLUCOMTR-MCNC: 281 MG/DL — HIGH (ref 70–99)
GLUCOSE SERPL-MCNC: 205 MG/DL — HIGH (ref 70–99)
HAPTOGLOB SERPL-MCNC: 270 MG/DL — HIGH (ref 34–200)
HCT VFR BLD CALC: 34.6 % — SIGNIFICANT CHANGE UP (ref 34.5–45)
HGB BLD-MCNC: 11.2 G/DL — LOW (ref 11.5–15.5)
MCHC RBC-ENTMCNC: 27.8 PG — SIGNIFICANT CHANGE UP (ref 27–34)
MCHC RBC-ENTMCNC: 32.3 GM/DL — SIGNIFICANT CHANGE UP (ref 32–36)
MCV RBC AUTO: 86 FL — SIGNIFICANT CHANGE UP (ref 80–100)
PLATELET # BLD AUTO: 200 K/UL — SIGNIFICANT CHANGE UP (ref 150–400)
POTASSIUM SERPL-MCNC: 4.9 MMOL/L — SIGNIFICANT CHANGE UP (ref 3.5–5.3)
POTASSIUM SERPL-SCNC: 4.9 MMOL/L — SIGNIFICANT CHANGE UP (ref 3.5–5.3)
RBC # BLD: 4.02 M/UL — SIGNIFICANT CHANGE UP (ref 3.8–5.2)
RBC # FLD: 14.2 % — SIGNIFICANT CHANGE UP (ref 10.3–14.5)
SODIUM SERPL-SCNC: 143 MMOL/L — SIGNIFICANT CHANGE UP (ref 135–145)
SPECIMEN SOURCE: SIGNIFICANT CHANGE UP
WBC # BLD: 11.4 K/UL — HIGH (ref 3.8–10.5)
WBC # FLD AUTO: 11.4 K/UL — HIGH (ref 3.8–10.5)

## 2018-09-06 PROCEDURE — 99239 HOSP IP/OBS DSCHRG MGMT >30: CPT

## 2018-09-06 RX ORDER — SODIUM CHLORIDE 9 MG/ML
1000 INJECTION INTRAMUSCULAR; INTRAVENOUS; SUBCUTANEOUS
Qty: 0 | Refills: 0 | Status: DISCONTINUED | OUTPATIENT
Start: 2018-09-06 | End: 2018-09-06

## 2018-09-06 RX ORDER — INSULIN GLARGINE 100 [IU]/ML
9 INJECTION, SOLUTION SUBCUTANEOUS ONCE
Qty: 0 | Refills: 0 | Status: COMPLETED | OUTPATIENT
Start: 2018-09-06 | End: 2018-09-06

## 2018-09-06 RX ORDER — ASPIRIN/CALCIUM CARB/MAGNESIUM 324 MG
1 TABLET ORAL
Qty: 30 | Refills: 0 | OUTPATIENT
Start: 2018-09-06 | End: 2018-10-05

## 2018-09-06 RX ORDER — INSULIN LISPRO 100/ML
6 VIAL (ML) SUBCUTANEOUS
Qty: 1 | Refills: 0 | OUTPATIENT
Start: 2018-09-06

## 2018-09-06 RX ORDER — SENNA PLUS 8.6 MG/1
2 TABLET ORAL
Qty: 60 | Refills: 0 | OUTPATIENT
Start: 2018-09-06 | End: 2018-10-05

## 2018-09-06 RX ORDER — METFORMIN HYDROCHLORIDE 850 MG/1
2 TABLET ORAL
Qty: 0 | Refills: 0 | COMMUNITY

## 2018-09-06 RX ORDER — INSULIN GLARGINE 100 [IU]/ML
19 INJECTION, SOLUTION SUBCUTANEOUS EVERY MORNING
Qty: 0 | Refills: 0 | Status: DISCONTINUED | OUTPATIENT
Start: 2018-09-07 | End: 2018-09-06

## 2018-09-06 RX ORDER — DOCUSATE SODIUM 100 MG
1 CAPSULE ORAL
Qty: 60 | Refills: 0 | OUTPATIENT
Start: 2018-09-06 | End: 2018-10-05

## 2018-09-06 RX ORDER — INSULIN LISPRO 100/ML
6 VIAL (ML) SUBCUTANEOUS
Qty: 0 | Refills: 0 | Status: DISCONTINUED | OUTPATIENT
Start: 2018-09-06 | End: 2018-09-06

## 2018-09-06 RX ORDER — INSULIN GLARGINE 100 [IU]/ML
19 INJECTION, SOLUTION SUBCUTANEOUS
Qty: 1 | Refills: 0 | OUTPATIENT
Start: 2018-09-06

## 2018-09-06 RX ORDER — GLYCERIN ADULT
1 SUPPOSITORY, RECTAL RECTAL ONCE
Qty: 0 | Refills: 0 | Status: COMPLETED | OUTPATIENT
Start: 2018-09-06 | End: 2018-09-06

## 2018-09-06 RX ORDER — ASPIRIN/CALCIUM CARB/MAGNESIUM 324 MG
1 TABLET ORAL
Qty: 0 | Refills: 0 | COMMUNITY

## 2018-09-06 RX ORDER — INSULIN LISPRO 100/ML
3 VIAL (ML) SUBCUTANEOUS
Qty: 1 | Refills: 0 | OUTPATIENT
Start: 2018-09-06

## 2018-09-06 RX ADMIN — Medication 6 UNIT(S): at 11:50

## 2018-09-06 RX ADMIN — INSULIN GLARGINE 10 UNIT(S): 100 INJECTION, SOLUTION SUBCUTANEOUS at 07:48

## 2018-09-06 RX ADMIN — Medication 6: at 11:51

## 2018-09-06 RX ADMIN — Medication 6 UNIT(S): at 17:04

## 2018-09-06 RX ADMIN — HEPARIN SODIUM 5000 UNIT(S): 5000 INJECTION INTRAVENOUS; SUBCUTANEOUS at 14:34

## 2018-09-06 RX ADMIN — SODIUM CHLORIDE 75 MILLILITER(S): 9 INJECTION INTRAMUSCULAR; INTRAVENOUS; SUBCUTANEOUS at 09:09

## 2018-09-06 RX ADMIN — Medication 40 MILLIGRAM(S): at 05:40

## 2018-09-06 RX ADMIN — Medication 300 MILLIGRAM(S): at 11:50

## 2018-09-06 RX ADMIN — Medication 2: at 17:04

## 2018-09-06 RX ADMIN — Medication 40 MILLIGRAM(S): at 14:34

## 2018-09-06 RX ADMIN — Medication 100 MILLIGRAM(S): at 17:03

## 2018-09-06 RX ADMIN — HEPARIN SODIUM 5000 UNIT(S): 5000 INJECTION INTRAVENOUS; SUBCUTANEOUS at 05:40

## 2018-09-06 RX ADMIN — PANTOPRAZOLE SODIUM 40 MILLIGRAM(S): 20 TABLET, DELAYED RELEASE ORAL at 05:40

## 2018-09-06 RX ADMIN — ESCITALOPRAM OXALATE 10 MILLIGRAM(S): 10 TABLET, FILM COATED ORAL at 11:50

## 2018-09-06 RX ADMIN — INSULIN GLARGINE 9 UNIT(S): 100 INJECTION, SOLUTION SUBCUTANEOUS at 09:09

## 2018-09-06 RX ADMIN — POLYETHYLENE GLYCOL 3350 17 GRAM(S): 17 POWDER, FOR SOLUTION ORAL at 11:49

## 2018-09-06 RX ADMIN — Medication 4: at 07:49

## 2018-09-06 RX ADMIN — GABAPENTIN 100 MILLIGRAM(S): 400 CAPSULE ORAL at 17:03

## 2018-09-06 RX ADMIN — Medication 100 MILLIGRAM(S): at 05:40

## 2018-09-06 RX ADMIN — Medication 325 MILLIGRAM(S): at 11:50

## 2018-09-06 RX ADMIN — Medication 1 SUPPOSITORY(S): at 11:48

## 2018-09-06 RX ADMIN — Medication 3 UNIT(S): at 07:49

## 2018-09-06 RX ADMIN — GABAPENTIN 100 MILLIGRAM(S): 400 CAPSULE ORAL at 05:40

## 2018-09-06 NOTE — PROGRESS NOTE ADULT - PROBLEM SELECTOR PLAN 4
BP controlled, stable
s/p lantus and insulin ss, a1c 11.7%  non ketotic state likely due to UTI
BP controlled, stable
insulin adjusted, Lantus, pre meal insulin,  and insulin ss, a1c 11.7%  non ketotic state likely due to UTI
Resolved, after finishing course of Abx and Elizondo insertion
resolved, H/H stable

## 2018-09-06 NOTE — PROGRESS NOTE ADULT - PROBLEM SELECTOR PROBLEM 6
Mixed hyperlipidemia
Diabetes
Mixed hyperlipidemia
Diabetes
Gastroesophageal reflux disease, esophagitis presence not specified

## 2018-09-06 NOTE — PROGRESS NOTE ADULT - PROBLEM SELECTOR PLAN 2
IV ceftriaxone completed  UA with only 3-5 WBCs, urine Cx contaminated,  ID   Septic Shock due to UTI with metabolic Encephelopathy, DONNA and Hyperglycemia now resolved
continue with lantus and pre-meal insulin
multifactorial, improving  reorient
Pt. with one episode of dark urine on 9/5/18, today, clear urine noted in Elizondo bag,  urology consult -  appreciated: Continue Elizondo until stable for Elizondo  Pt. will have void trial outpatient to assisted living
multifactorial, improving  reorient
multifactorial, improving  reorient

## 2018-09-06 NOTE — CONSULT NOTE ADULT - ASSESSMENT
urinary retention, likely neurogenic bladder
89 yo F, DM, resident assisted living, here with confusion, poorly controlled DM, hyperglycemia, elevated lactate, and leukocytosis- severe sepsis criteria.  She's been maintained on empiric CTX, remaining afebrile, Gluc now controlled, WBC moderating, hemodyn stable.  UA with only 3-5 WBCs, thus Dx of UTI less certain.  No clues to alternative source of infection, if present.  Bld Cxs negative thus far.    Plan:  With clinical improvement, will follow on empiric CTX for now  Await Urine Cx result  Likely will be able to limit abx duration  Follow temps and CBC/diff   d/w Dr Saavedra

## 2018-09-06 NOTE — PROGRESS NOTE ADULT - PROBLEM SELECTOR PROBLEM 3
Metabolic encephalopathy
Hypertension, unspecified type
Metabolic encephalopathy
Urinary tract infection without hematuria, site unspecified
Blood in stool
Hypertension, unspecified type

## 2018-09-06 NOTE — PROGRESS NOTE ADULT - SUBJECTIVE AND OBJECTIVE BOX
Patient is a 88y old  Female who presents with a chief complaint of hyperglycemia / UTI (06 Sep 2018 08:17)      Patient seen and examined at bedside.    ALLERGIES:  penicillin (Unknown)  predniSONE (Unknown)    MEDICATIONS:  allopurinol 300 milliGRAM(s) Oral daily  aspirin 325 milliGRAM(s) Oral daily  atorvastatin 40 milliGRAM(s) Oral at bedtime  dextrose 40% Gel 15 Gram(s) Oral once PRN  dextrose 5%. 1000 milliLiter(s) IV Continuous <Continuous>  dextrose 50% Injectable 12.5 Gram(s) IV Push once  dextrose 50% Injectable 25 Gram(s) IV Push once  dextrose 50% Injectable 25 Gram(s) IV Push once  docusate sodium 100 milliGRAM(s) Oral two times a day  escitalopram 10 milliGRAM(s) Oral daily  gabapentin 100 milliGRAM(s) Oral two times a day  glucagon  Injectable 1 milliGRAM(s) IntraMuscular once PRN  heparin  Injectable 5000 Unit(s) SubCutaneous every 8 hours  insulin glargine Injectable (LANTUS) 10 Unit(s) SubCutaneous every morning  insulin lispro (HumaLOG) corrective regimen sliding scale   SubCutaneous three times a day before meals  insulin lispro (HumaLOG) corrective regimen sliding scale   SubCutaneous at bedtime  insulin lispro Injectable (HumaLOG) 6 Unit(s) SubCutaneous three times a day before meals  meclizine 25 milliGRAM(s) Oral two times a day PRN  pantoprazole    Tablet 40 milliGRAM(s) Oral before breakfast  polyethylene glycol 3350 17 Gram(s) Oral daily  propranolol 40 milliGRAM(s) Oral three times a day  senna 2 Tablet(s) Oral at bedtime  sodium chloride 0.9%. 1000 milliLiter(s) IV Continuous <Continuous>    Vital Signs Last 24 Hrs  T(C): 36.8 (06 Sep 2018 05:34), Max: 36.8 (06 Sep 2018 05:34)  T(F): 98.3 (06 Sep 2018 05:34), Max: 98.3 (06 Sep 2018 05:34)  HR: 61 (06 Sep 2018 05:34) (61 - 72)  BP: 115/43 (06 Sep 2018 05:34) (115/43 - 153/74)  BP(mean): --  RR: 14 (06 Sep 2018 05:34) (14 - 14)  SpO2: 95% (06 Sep 2018 05:34) (95% - 98%)  I&O's Summary    05 Sep 2018 07:  -  06 Sep 2018 07:00  --------------------------------------------------------  IN: 660 mL / OUT: 3400 mL / NET: -2740 mL    06 Sep 2018 07:  -  06 Sep 2018 12:17  --------------------------------------------------------  IN: 360 mL / OUT: 0 mL / NET: 360 mL          PAST MEDICAL & SURGICAL HISTORY:  Hyperlipemia  Major depressive disorder  GERD (gastroesophageal reflux disease)  Osteoarthritis  Vertigo  H/O: gout  HTN (hypertension)  Diabetes  No significant past surgical history      Home Medications:  allopurinol 300 mg oral tablet: 1 tab(s) orally once a day (29 Aug 2018 21:12)  aspirin 325 mg oral tablet: 1 tab(s) orally once a day (29 Aug 2018 21:13)  cholestyramine 4 g/5 g oral powder for reconstitution:  (29 Aug 2018 21:13)  escitalopram 10 mg oral tablet: 1 tab(s) orally once a day (29 Aug 2018 21:14)  gabapentin 100 mg oral capsule: 2 cap(s) orally 2 times a day (29 Aug 2018 21:14)  Lipitor 40 mg oral tablet: 1 tab(s) orally once a day (29 Aug 2018 21:15)  meclizine 25 mg oral tablet: 1 tab(s) orally 2 times a day, As Needed (29 Aug 2018 21:15)  metFORMIN 500 mg oral tablet, extended release: 2 tab(s) orally once a day (29 Aug 2018 21:19)  propranolol 40 mg oral tablet: 1 tab(s) orally 3 times a day (29 Aug 2018 21:18)  Protonix 40 mg oral delayed release tablet: 1 tab(s) orally once a day (29 Aug 2018 21:17)  raNITIdine 150 mg oral tablet: 1 tab(s) orally 2 times a day (04 Sep 2018 15:00)  valsartan-hydrochlorothiazide 320mg-12.5mg oral tablet: 1 tab(s) orally once a day (29 Aug 2018 21:17)      PHYSICAL EXAM:  General: NAD, A/O x3  ENT: MMM  Neck: Supple, No JVD  Lungs: Clear to percussion bilaterally  Cardio: RRR, S1/S2, No murmurs  Abdomen: Soft, Nontender, Nondistended; Bowel sounds present  Extremities: No clubbing, cyanosis, or edema    LABS:                        11.2   11.4  )-----------( 200      ( 06 Sep 2018 06:57 )             34.6         143  |  105  |  16  ----------------------------<  205  4.9   |  32  |  0.88    Ca    9.9      06 Sep 2018 06:57    TPro  6.0  /  Alb  2.2  /  TBili  0.7  /  DBili  x   /  AST  51  /  ALT  36  /  AlkPhos  77                  CAPILLARY BLOOD GLUCOSE      POCT Blood Glucose.: 281 mg/dL (06 Sep 2018 11:45)  POCT Blood Glucose.: 223 mg/dL (06 Sep 2018 07:41)  POCT Blood Glucose.: 259 mg/dL (05 Sep 2018 20:40)  POCT Blood Glucose.: 310 mg/dL (05 Sep 2018 16:58)     [id number] 11.7    Urinalysis Basic - ( 05 Sep 2018 20:12 )    Color: Yellow / Appearance: Clear / S.015 / pH: x  Gluc: x / Ketone: Trace  / Bili: Negative / Urobili: 1   Blood: x / Protein: 30 mg/dL / Nitrite: Negative   Leuk Esterase: Small / RBC: >50 /HPF / WBC 3-5 /HPF   Sq Epi: x / Non Sq Epi: Neg.-Few / Bacteria: Trace /HPF          RADIOLOGY & ADDITIONAL TESTS: < from: Xray Abdomen 2 Views (18 @ 16:21) >  FINDINGS:  Fecal material is scattered throughout the colon. There is no   evidence for mechanical bowel obstruction. No free intraperitoneal air is   identified. Surgical clips are identified within the right upper   quadrant, likely related to prior cholecystectomy. Degenerative changes   of the lumbar spine noted.    < end of copied text >      Care Discussed with Consultants/Other Providers: Hospitalist

## 2018-09-06 NOTE — PROGRESS NOTE ADULT - PROBLEM SELECTOR PLAN 5
insulin adjusted, Lantus, pre meal insulin,  and insulin ss, a1c 11.7%  non ketotic state likely due to UTI
continue lipitor
insulin adjusted, Lantus, pre meal insulin,  and insulin ss, a1c 11.7%  non ketotic state likely due to UTI
continue lipitor
Controlled on current regimen

## 2018-09-06 NOTE — PROGRESS NOTE ADULT - PROBLEM SELECTOR PLAN 1
Hyperosmolar hyperglycemic coma due to diabetes mellitus without ketoacidosis.  Lantus was increased to 19 units, FS improved Hyperosmolar hyperglycemic coma due to diabetes mellitus without ketoacidosis.  Lantus was increased to 19 units, Pre-meal insulin was increased to 6 units  FS improved

## 2018-09-06 NOTE — CONSULT NOTE ADULT - SUBJECTIVE AND OBJECTIVE BOX
Patient is a 88y old  Female who presents with a chief complaint of hyperglycemia / UTI (05 Sep 2018 18:01)      HPI:  88 year old female who presents with increased confusion and hyperglycemia. On evaluation in the ER she was found to have serum glucose of 1124 with lactate of 7.8 and UA shows UTI. She is awake alert responsive to voice and answerers questions but confused in conversation,  per daughter at bedside has baseline confusion but tonight seems increased. She has hemodynamically stable vital signs no report of fever chills N/V/D or urinary difficulties. There is report of questionable blood in patients depends but here the hemoglobin is stable and no signs of bleeding. (29 Aug 2018 21:48)    found to have distended bladder and bennett placed for 2000ml. urine bloody but cleared. patient admits to mild baseline incontinence but no pain from bladder distention.      PAST MEDICAL & SURGICAL HISTORY:  Hyperlipemia  Major depressive disorder  GERD (gastroesophageal reflux disease)  Osteoarthritis  Vertigo  H/O: gout  HTN (hypertension)  Diabetes  No significant past surgical history      REVIEW OF SYSTEMS:    CONSTITUTIONAL:  no fever or chills  HEENT: No visual changes  ENDO: No sweating  NECK: No pain or stiffness  MUSCULOSKELETAL: No back pain, no joint pain  RESPIRATORY: No shortness of breath  CARDIOVASCULAR: No chest pain  GASTROINTESTINAL: No abdominal or epigastric pain. No nausea, vomiting,  No diarrhea or constipation.   NEUROLOGICAL: No mental status changes  PSYCH: No depression, no mood changes  SKIN: No itching      MEDICATIONS  (STANDING):  allopurinol 300 milliGRAM(s) Oral daily  aspirin 325 milliGRAM(s) Oral daily  atorvastatin 40 milliGRAM(s) Oral at bedtime  dextrose 5%. 1000 milliLiter(s) (50 mL/Hr) IV Continuous <Continuous>  dextrose 50% Injectable 12.5 Gram(s) IV Push once  dextrose 50% Injectable 25 Gram(s) IV Push once  dextrose 50% Injectable 25 Gram(s) IV Push once  docusate sodium 100 milliGRAM(s) Oral two times a day  escitalopram 10 milliGRAM(s) Oral daily  gabapentin 100 milliGRAM(s) Oral two times a day  heparin  Injectable 5000 Unit(s) SubCutaneous every 8 hours  insulin glargine Injectable (LANTUS) 10 Unit(s) SubCutaneous every morning  insulin lispro (HumaLOG) corrective regimen sliding scale   SubCutaneous three times a day before meals  insulin lispro (HumaLOG) corrective regimen sliding scale   SubCutaneous at bedtime  insulin lispro Injectable (HumaLOG) 3 Unit(s) SubCutaneous three times a day with meals  pantoprazole    Tablet 40 milliGRAM(s) Oral before breakfast  polyethylene glycol 3350 17 Gram(s) Oral daily  propranolol 40 milliGRAM(s) Oral three times a day  senna 2 Tablet(s) Oral at bedtime    MEDICATIONS  (PRN):  dextrose 40% Gel 15 Gram(s) Oral once PRN Blood Glucose LESS THAN 70 milliGRAM(s)/deciliter  glucagon  Injectable 1 milliGRAM(s) IntraMuscular once PRN Glucose LESS THAN 70 milligrams/deciliter  meclizine 25 milliGRAM(s) Oral two times a day PRN Dizziness      Allergies    penicillin (Unknown)  predniSONE (Unknown)    Intolerances        SOCIAL HISTORY: No illicit drug use    FAMILY HISTORY:  No pertinent family history in first degree relatives        T(C): 36.8 (18 @ 05:34), Max: 36.8 (18 @ 15:24)  T(F): 98.3 (18 @ 05:34), Max: 98.3 (18 @ 05:34)  HR: 61 (18 @ 05:34) (58 - 74)  BP: 115/43 (18 @ 05:34) (115/43 - 171/57)  RR: 14 (18 @ 05:34) (14 - 14)  SpO2: 95% (18 @ 05:34) (94% - 98%)      PHYSICAL EXAM:    Constitutional: alert, no acute distress, confused  Back: No CVA tenderness  Respiratory: No accessory respiratory muscle use  Abd: Soft, NT/ND  no organomegaly  no hernia  : no bladder distention, bennett yellow  Extremities: no edema  Neurological: A/O x 3  Psychiatric: Normal mood, normal affect  Skin: No rashes      18 @ 07:01  -  18 @ 07:00  --------------------------------------------------------  IN:  Total IN: 0 mL    OUT:    Indwelling Catheter - Urethral: 1600 mL    Intermittent Catheterization - Urethral: 1800 mL  Total OUT: 3400 mL    Total NET: -3400 mL              LABS:                        10.8   10.4  )-----------( 191      ( 05 Sep 2018 15:39 )             32.6     -    134<L>  |  101  |  23  ----------------------------<  358<H>  4.2   |  29  |  0.95    Ca    10.0      05 Sep 2018 15:39        Urinalysis Basic - ( 05 Sep 2018 20:12 )    Color: Yellow / Appearance: Clear / S.015 / pH: x  Gluc: x / Ketone: Trace  / Bili: Negative / Urobili: 1   Blood: x / Protein: 30 mg/dL / Nitrite: Negative   Leuk Esterase: Small / RBC: >50 /HPF / WBC 3-5 /HPF   Sq Epi: x / Non Sq Epi: Neg.-Few / Bacteria: Trace /HPF            RADIOLOGY & ADDITIONAL STUDIES:
HPI:   Patient is a 88y female with DM (Metformin), HTN, depression, resident of assisted living, poor informant, admitted  with increased confusion (she remembers nothing) and found to have Gluc >1,000, high lactate, A1C >11, leukocytosis- fluids and Insulin drip initiated.  She's also been maintained on IV CTX for question of sepsis and UTI- UA Nitrite +, small LE, but only 3-5 WBCs.  She remains hemodyn stable, afebrile since arrival, WBC moderating.  She denies pain, Resp or GI Sxs.  She states she voids frequently but denies dysuria.    REVIEW OF SYSTEMS:  All other review of systems negative (Comprehensive ROS)- limited, confusion, ?dementia    PAST MEDICAL & SURGICAL HISTORY:  Hyperlipemia  Major depressive disorder  GERD (gastroesophageal reflux disease)  Osteoarthritis  Vertigo  H/O: gout  HTN (hypertension)  Diabetes  No significant past surgical history      Allergies  penicillin (Unknown)  predniSONE (Unknown)    Antimicrobials Day #  3  cefTRIAXone   IVPB 1 Gram(s) IV Intermittent every 24 hours    Other Medications:  allopurinol 300 milliGRAM(s) Oral daily  aspirin 325 milliGRAM(s) Oral daily  atorvastatin 40 milliGRAM(s) Oral at bedtime  dextrose 40% Gel 15 Gram(s) Oral once PRN  dextrose 5%. 1000 milliLiter(s) IV Continuous <Continuous>  dextrose 50% Injectable 12.5 Gram(s) IV Push once  dextrose 50% Injectable 25 Gram(s) IV Push once  dextrose 50% Injectable 25 Gram(s) IV Push once  escitalopram 10 milliGRAM(s) Oral daily  gabapentin 100 milliGRAM(s) Oral two times a day  glucagon  Injectable 1 milliGRAM(s) IntraMuscular once PRN  heparin  Injectable 5000 Unit(s) SubCutaneous every 8 hours  insulin glargine Injectable (LANTUS) 5 Unit(s) SubCutaneous at bedtime  insulin Infusion 4 Unit(s)/Hr IV Continuous <Continuous>  insulin lispro (HumaLOG) corrective regimen sliding scale   SubCutaneous three times a day before meals  insulin lispro (HumaLOG) corrective regimen sliding scale   SubCutaneous at bedtime  lactated ringers. 1000 milliLiter(s) IV Continuous <Continuous>  meclizine 25 milliGRAM(s) Oral two times a day PRN  pantoprazole    Tablet 40 milliGRAM(s) Oral before breakfast  propranolol 40 milliGRAM(s) Oral three times a day  sodium chloride 0.9%. 1000 milliLiter(s) IV Continuous <Continuous>      FAMILY HISTORY:  No pertinent family history in first degree relatives      SOCIAL HISTORY:  Smoking: no   ETOH: no       T(F): 98.2 (18 @ 06:00), Max: 99.2 (18 @ 10:00)  HR: 70 (18 @ 08:00)  BP: 125/80 (18 @ 08:00)  RR: 15 (18 @ 08:00)  SpO2: 98% (18 @ 08:00)  Wt(kg): --    PHYSICAL EXAM:  General: alert, no acute distress  Eyes:  anicteric, no conjunctival injection, no discharge  Oropharynx: no lesions or injection 	  Neck: supple, without adenopathy  Lungs: clear to auscultation  Heart: regular rate and rhythm; no murmur, rubs or gallops  Abdomen: soft, nondistended, nontender, without mass or organomegaly  Skin: no lesions  Extremities: no clubbing, cyanosis, or edema  Neurologic: alert, oriented x person only, moves all extremities    LAB RESULTS:                        10.6   16.4  )-----------( 153      ( 31 Aug 2018 04:30 )             32.3     08-    144  |  111<H>  |  20  ----------------------------<  154<H>  3.9   |  25  |  0.86    Ca    9.2      31 Aug 2018 04:30  Phos  2.7       Mg     1.0         TPro  8.1  /  Alb  3.5  /  TBili  1.1  /  DBili  x   /  AST  31  /  ALT  31  /  AlkPhos  87  08    LIVER FUNCTIONS - ( 29 Aug 2018 19:00 )  Alb: 3.5 g/dL / Pro: 8.1 g/dL / ALK PHOS: 87 U/L / ALT: 31 U/L DA / AST: 31 U/L / GGT: x           Urinalysis Basic - ( 29 Aug 2018 20:30 )    Color: Yellow / Appearance: Clear / S.010 / pH: x  Gluc: x / Ketone: Small  / Bili: Negative / Urobili: Negative   Blood: x / Protein: 500 mg/dL / Nitrite: Positive   Leuk Esterase: Small / RBC: >50 /HPF / WBC 3-5 /HPF   Sq Epi: x / Non Sq Epi: Neg.-Few / Bacteria: Many /HPF    MICROBIOLOGY:  RECENT CULTURES:   @ 19:00 .Blood Blood-Peripheral     No growth to date.    RADIOLOGY REVIEWED:  Xray Chest 1 View-PORTABLE IMMEDIATE (18 @ 19:15) >  Clear  lungs.  No acute airspace disease suggested.

## 2018-09-06 NOTE — CONSULT NOTE ADULT - PROBLEM SELECTOR RECOMMENDATION 9
bennett placed and bladder decompressed. may have neurogenic bladder ? from diabetes. could consider void trial when medically stable and mobilizing

## 2018-09-06 NOTE — PROGRESS NOTE ADULT - PROBLEM SELECTOR PLAN 6
continue lipitor
s/p lantus and insulin ss, a1c 11.7%  Glycemic control with insulin
continue lipitor
insulin adjusted, Lantus, pre meal insulin,  and insulin ss  a1c 11.7%  Glycemic control with insulin
Continue PPI

## 2018-09-06 NOTE — PROGRESS NOTE ADULT - PROBLEM SELECTOR PROBLEM 4
Hypertension, unspecified type
Hyperosmolar hyperglycemic coma due to diabetes mellitus without ketoacidosis
Hypertension, unspecified type
Blood in stool
Hyperosmolar hyperglycemic coma due to diabetes mellitus without ketoacidosis
Metabolic encephalopathy

## 2018-09-06 NOTE — PROGRESS NOTE ADULT - PROBLEM SELECTOR PROBLEM 2
Urinary tract infection without hematuria, site unspecified
Diabetes
Metabolic encephalopathy
Urinary retention
Metabolic encephalopathy
Metabolic encephalopathy

## 2018-09-06 NOTE — PROGRESS NOTE ADULT - ATTENDING COMMENTS
D/c planning, see discharge note for details
Patient seen and examined. Patient's history, vitals, labs, imaging studies reviewed. Plan of care discussed with patient, and daughter, agrees, all questions answered.   Janet Edmondson MD
Plan of care discussed with patient, daughter, agrees, all questions answered.   Janet Edmondson MD
I have personally seen and examined patient on the above date.  I discussed the case with Hair CANTU and I agree with findings and plan as detailed per note above, which I have amended where appropriate.    Pt can return to facility with bennett for delayed void trial, urine is clear, treated for UTI, stable for discharge.
Plan of care discussed with patient, daughter at bedside, agrees, all questions answered. Re-eval for discharge tomorrow  Janet Edmondson MD
Plan of care discussed with patient, agrees, all questions answered.   Janet Edmondson MD

## 2018-09-06 NOTE — PROGRESS NOTE ADULT - ASSESSMENT
87 y/o female with PMH of T2DM, HTN, p/w hyperosmolar coma and urinary retention/UTI. Pt. was admitted to ICU, where her T2Dm diabetes meds were adjusted and then transfered to medical floor, now stable for discharge.

## 2018-09-06 NOTE — PROGRESS NOTE ADULT - PROBLEM SELECTOR PROBLEM 1
Urinary tract infection without hematuria, site unspecified
Urinary tract infection without hematuria, site unspecified
Hyperosmolar hyperglycemic coma due to diabetes mellitus without ketoacidosis
Urinary tract infection without hematuria, site unspecified
Urinary tract infection without hematuria, site unspecified
Urinary retention

## 2018-09-06 NOTE — PROGRESS NOTE ADULT - PROBLEM SELECTOR PLAN 3
monitor cbc  has history of hemorrhoids
BP controlled, stable
multifactorial, improving  reorient
BP controlled, stable
improving
Pt. afebrile,  Leukocytosis improved after empiric Rocephin   ID consult appreciated - Dr. Velázquez: continue off antibiotics

## 2018-09-06 NOTE — PROGRESS NOTE ADULT - PROBLEM SELECTOR PROBLEM 5
Hyperosmolar hyperglycemic coma due to diabetes mellitus without ketoacidosis
Mixed hyperlipidemia
Hyperosmolar hyperglycemic coma due to diabetes mellitus without ketoacidosis
Mixed hyperlipidemia
Hypertension, unspecified type

## 2018-09-09 LAB — G6PD RBC-CCNC: 17.1 U/G HGB — SIGNIFICANT CHANGE UP (ref 7–20.5)

## 2018-10-24 PROCEDURE — 87040 BLOOD CULTURE FOR BACTERIA: CPT

## 2018-10-24 PROCEDURE — 97530 THERAPEUTIC ACTIVITIES: CPT

## 2018-10-24 PROCEDURE — 97161 PT EVAL LOW COMPLEX 20 MIN: CPT

## 2018-10-24 PROCEDURE — 85610 PROTHROMBIN TIME: CPT

## 2018-10-24 PROCEDURE — 83036 HEMOGLOBIN GLYCOSYLATED A1C: CPT

## 2018-10-24 PROCEDURE — 83605 ASSAY OF LACTIC ACID: CPT

## 2018-10-24 PROCEDURE — 82962 GLUCOSE BLOOD TEST: CPT

## 2018-10-24 PROCEDURE — 82272 OCCULT BLD FECES 1-3 TESTS: CPT

## 2018-10-24 PROCEDURE — 96375 TX/PRO/DX INJ NEW DRUG ADDON: CPT

## 2018-10-24 PROCEDURE — 80048 BASIC METABOLIC PNL TOTAL CA: CPT

## 2018-10-24 PROCEDURE — 81001 URINALYSIS AUTO W/SCOPE: CPT

## 2018-10-24 PROCEDURE — 82803 BLOOD GASES ANY COMBINATION: CPT

## 2018-10-24 PROCEDURE — 84100 ASSAY OF PHOSPHORUS: CPT

## 2018-10-24 PROCEDURE — 70450 CT HEAD/BRAIN W/O DYE: CPT

## 2018-10-24 PROCEDURE — 96365 THER/PROPH/DIAG IV INF INIT: CPT

## 2018-10-24 PROCEDURE — 71045 X-RAY EXAM CHEST 1 VIEW: CPT

## 2018-10-24 PROCEDURE — 74019 RADEX ABDOMEN 2 VIEWS: CPT

## 2018-10-24 PROCEDURE — 85730 THROMBOPLASTIN TIME PARTIAL: CPT

## 2018-10-24 PROCEDURE — 93005 ELECTROCARDIOGRAM TRACING: CPT

## 2018-10-24 PROCEDURE — 86900 BLOOD TYPING SEROLOGIC ABO: CPT

## 2018-10-24 PROCEDURE — 87086 URINE CULTURE/COLONY COUNT: CPT

## 2018-10-24 PROCEDURE — 85027 COMPLETE CBC AUTOMATED: CPT

## 2018-10-24 PROCEDURE — 80053 COMPREHEN METABOLIC PANEL: CPT

## 2018-10-24 PROCEDURE — 96376 TX/PRO/DX INJ SAME DRUG ADON: CPT

## 2018-10-24 PROCEDURE — 82009 KETONE BODYS QUAL: CPT

## 2018-10-24 PROCEDURE — 99285 EMERGENCY DEPT VISIT HI MDM: CPT | Mod: 25

## 2018-10-24 PROCEDURE — 83735 ASSAY OF MAGNESIUM: CPT

## 2018-10-24 PROCEDURE — 86850 RBC ANTIBODY SCREEN: CPT

## 2018-10-24 PROCEDURE — 82955 ASSAY OF G6PD ENZYME: CPT

## 2018-10-24 PROCEDURE — 86901 BLOOD TYPING SEROLOGIC RH(D): CPT

## 2018-10-24 PROCEDURE — 83010 ASSAY OF HAPTOGLOBIN QUANT: CPT

## 2018-10-26 ENCOUNTER — EMERGENCY (EMERGENCY)
Facility: HOSPITAL | Age: 83
LOS: 1 days | Discharge: ANOTHER HOSPITAL | End: 2018-10-26
Attending: INTERNAL MEDICINE | Admitting: INTERNAL MEDICINE
Payer: MEDICARE

## 2018-10-26 VITALS
SYSTOLIC BLOOD PRESSURE: 126 MMHG | HEART RATE: 59 BPM | DIASTOLIC BLOOD PRESSURE: 78 MMHG | RESPIRATION RATE: 17 BRPM | OXYGEN SATURATION: 96 % | TEMPERATURE: 98 F | WEIGHT: 169.98 LBS | HEIGHT: 67 IN

## 2018-10-26 VITALS
HEART RATE: 66 BPM | RESPIRATION RATE: 18 BRPM | TEMPERATURE: 99 F | OXYGEN SATURATION: 96 % | DIASTOLIC BLOOD PRESSURE: 75 MMHG | SYSTOLIC BLOOD PRESSURE: 134 MMHG

## 2018-10-26 PROBLEM — E78.5 HYPERLIPIDEMIA, UNSPECIFIED: Chronic | Status: ACTIVE | Noted: 2018-08-29

## 2018-10-26 PROBLEM — R42 DIZZINESS AND GIDDINESS: Chronic | Status: ACTIVE | Noted: 2018-08-29

## 2018-10-26 PROBLEM — F32.9 MAJOR DEPRESSIVE DISORDER, SINGLE EPISODE, UNSPECIFIED: Chronic | Status: ACTIVE | Noted: 2018-08-29

## 2018-10-26 PROBLEM — E11.9 TYPE 2 DIABETES MELLITUS WITHOUT COMPLICATIONS: Chronic | Status: ACTIVE | Noted: 2018-08-29

## 2018-10-26 PROBLEM — K21.9 GASTRO-ESOPHAGEAL REFLUX DISEASE WITHOUT ESOPHAGITIS: Chronic | Status: ACTIVE | Noted: 2018-08-29

## 2018-10-26 PROBLEM — I10 ESSENTIAL (PRIMARY) HYPERTENSION: Chronic | Status: ACTIVE | Noted: 2018-08-29

## 2018-10-26 PROBLEM — M19.90 UNSPECIFIED OSTEOARTHRITIS, UNSPECIFIED SITE: Chronic | Status: ACTIVE | Noted: 2018-08-29

## 2018-10-26 PROBLEM — Z87.39 PERSONAL HISTORY OF OTHER DISEASES OF THE MUSCULOSKELETAL SYSTEM AND CONNECTIVE TISSUE: Chronic | Status: ACTIVE | Noted: 2018-08-29

## 2018-10-26 LAB
ALBUMIN SERPL ELPH-MCNC: 2.9 G/DL — LOW (ref 3.3–5)
ALP SERPL-CCNC: 74 U/L — SIGNIFICANT CHANGE UP (ref 40–120)
ALT FLD-CCNC: 26 U/L DA — SIGNIFICANT CHANGE UP (ref 10–45)
ANION GAP SERPL CALC-SCNC: 10 MMOL/L — SIGNIFICANT CHANGE UP (ref 5–17)
APPEARANCE UR: CLEAR — SIGNIFICANT CHANGE UP
APTT BLD: 25.7 SEC — LOW (ref 27.5–37.4)
AST SERPL-CCNC: 33 U/L — SIGNIFICANT CHANGE UP (ref 10–40)
BASOPHILS # BLD AUTO: 0.1 K/UL — SIGNIFICANT CHANGE UP (ref 0–0.2)
BASOPHILS NFR BLD AUTO: 0.8 % — SIGNIFICANT CHANGE UP (ref 0–2)
BILIRUB SERPL-MCNC: 1.1 MG/DL — SIGNIFICANT CHANGE UP (ref 0.2–1.2)
BILIRUB UR-MCNC: NEGATIVE — SIGNIFICANT CHANGE UP
BUN SERPL-MCNC: 22 MG/DL — SIGNIFICANT CHANGE UP (ref 7–23)
CALCIUM SERPL-MCNC: 9.4 MG/DL — SIGNIFICANT CHANGE UP (ref 8.4–10.5)
CHLORIDE SERPL-SCNC: 97 MMOL/L — SIGNIFICANT CHANGE UP (ref 96–108)
CO2 SERPL-SCNC: 30 MMOL/L — SIGNIFICANT CHANGE UP (ref 22–31)
COLOR SPEC: YELLOW — SIGNIFICANT CHANGE UP
CREAT SERPL-MCNC: 1.21 MG/DL — SIGNIFICANT CHANGE UP (ref 0.5–1.3)
DIFF PNL FLD: NEGATIVE — SIGNIFICANT CHANGE UP
EOSINOPHIL # BLD AUTO: 2.4 K/UL — HIGH (ref 0–0.5)
EOSINOPHIL NFR BLD AUTO: 19.2 % — HIGH (ref 0–6)
GLUCOSE SERPL-MCNC: 144 MG/DL — HIGH (ref 70–99)
GLUCOSE UR QL: NEGATIVE — SIGNIFICANT CHANGE UP
HCT VFR BLD CALC: 37.4 % — SIGNIFICANT CHANGE UP (ref 34.5–45)
HGB BLD-MCNC: 11.9 G/DL — SIGNIFICANT CHANGE UP (ref 11.5–15.5)
INR BLD: 1.12 RATIO — SIGNIFICANT CHANGE UP (ref 0.88–1.16)
KETONES UR-MCNC: NEGATIVE — SIGNIFICANT CHANGE UP
LACTATE SERPL-SCNC: 1.8 MMOL/L — SIGNIFICANT CHANGE UP (ref 0.7–2)
LEUKOCYTE ESTERASE UR-ACNC: NEGATIVE — SIGNIFICANT CHANGE UP
LYMPHOCYTES # BLD AUTO: 1.5 K/UL — SIGNIFICANT CHANGE UP (ref 1–3.3)
LYMPHOCYTES # BLD AUTO: 11.4 % — LOW (ref 13–44)
MAGNESIUM SERPL-MCNC: 1.4 MG/DL — LOW (ref 1.6–2.6)
MCHC RBC-ENTMCNC: 26.4 PG — LOW (ref 27–34)
MCHC RBC-ENTMCNC: 31.8 GM/DL — LOW (ref 32–36)
MCV RBC AUTO: 83.2 FL — SIGNIFICANT CHANGE UP (ref 80–100)
MONOCYTES # BLD AUTO: 0.9 K/UL — SIGNIFICANT CHANGE UP (ref 0–0.9)
MONOCYTES NFR BLD AUTO: 6.8 % — SIGNIFICANT CHANGE UP (ref 1–9)
NEUTROPHILS # BLD AUTO: 7.9 K/UL — HIGH (ref 1.8–7.4)
NEUTROPHILS NFR BLD AUTO: 61.8 % — SIGNIFICANT CHANGE UP (ref 43–77)
NITRITE UR-MCNC: NEGATIVE — SIGNIFICANT CHANGE UP
PH UR: 7 — SIGNIFICANT CHANGE UP (ref 5–8)
PLATELET # BLD AUTO: 201 K/UL — SIGNIFICANT CHANGE UP (ref 150–400)
POTASSIUM SERPL-MCNC: 3.8 MMOL/L — SIGNIFICANT CHANGE UP (ref 3.5–5.3)
POTASSIUM SERPL-SCNC: 3.8 MMOL/L — SIGNIFICANT CHANGE UP (ref 3.5–5.3)
PROT SERPL-MCNC: 7.3 G/DL — SIGNIFICANT CHANGE UP (ref 6–8.3)
PROT UR-MCNC: NEGATIVE — SIGNIFICANT CHANGE UP
PROTHROM AB SERPL-ACNC: 12.5 SEC — SIGNIFICANT CHANGE UP (ref 9.8–12.7)
RBC # BLD: 4.5 M/UL — SIGNIFICANT CHANGE UP (ref 3.8–5.2)
RBC # FLD: 14.9 % — HIGH (ref 10.3–14.5)
SODIUM SERPL-SCNC: 137 MMOL/L — SIGNIFICANT CHANGE UP (ref 135–145)
SP GR SPEC: 1.01 — SIGNIFICANT CHANGE UP (ref 1.01–1.02)
TROPONIN I SERPL-MCNC: <.017 NG/ML — LOW (ref 0.02–0.06)
UROBILINOGEN FLD QL: NEGATIVE — SIGNIFICANT CHANGE UP
WBC # BLD: 12.7 K/UL — HIGH (ref 3.8–10.5)
WBC # FLD AUTO: 12.7 K/UL — HIGH (ref 3.8–10.5)

## 2018-10-26 PROCEDURE — 87086 URINE CULTURE/COLONY COUNT: CPT

## 2018-10-26 PROCEDURE — 83605 ASSAY OF LACTIC ACID: CPT

## 2018-10-26 PROCEDURE — 70450 CT HEAD/BRAIN W/O DYE: CPT | Mod: 26

## 2018-10-26 PROCEDURE — 71045 X-RAY EXAM CHEST 1 VIEW: CPT | Mod: 26

## 2018-10-26 PROCEDURE — 70450 CT HEAD/BRAIN W/O DYE: CPT

## 2018-10-26 PROCEDURE — 83735 ASSAY OF MAGNESIUM: CPT

## 2018-10-26 PROCEDURE — 99285 EMERGENCY DEPT VISIT HI MDM: CPT

## 2018-10-26 PROCEDURE — 93005 ELECTROCARDIOGRAM TRACING: CPT

## 2018-10-26 PROCEDURE — 80053 COMPREHEN METABOLIC PANEL: CPT

## 2018-10-26 PROCEDURE — 93010 ELECTROCARDIOGRAM REPORT: CPT

## 2018-10-26 PROCEDURE — 83520 IMMUNOASSAY QUANT NOS NONAB: CPT

## 2018-10-26 PROCEDURE — 96365 THER/PROPH/DIAG IV INF INIT: CPT

## 2018-10-26 PROCEDURE — 87040 BLOOD CULTURE FOR BACTERIA: CPT

## 2018-10-26 PROCEDURE — 84484 ASSAY OF TROPONIN QUANT: CPT

## 2018-10-26 PROCEDURE — 71045 X-RAY EXAM CHEST 1 VIEW: CPT

## 2018-10-26 PROCEDURE — 85730 THROMBOPLASTIN TIME PARTIAL: CPT

## 2018-10-26 PROCEDURE — 85610 PROTHROMBIN TIME: CPT

## 2018-10-26 PROCEDURE — 96367 TX/PROPH/DG ADDL SEQ IV INF: CPT

## 2018-10-26 PROCEDURE — 96366 THER/PROPH/DIAG IV INF ADDON: CPT

## 2018-10-26 PROCEDURE — 99285 EMERGENCY DEPT VISIT HI MDM: CPT | Mod: 25

## 2018-10-26 PROCEDURE — 84181 WESTERN BLOT TEST: CPT

## 2018-10-26 PROCEDURE — 85027 COMPLETE CBC AUTOMATED: CPT

## 2018-10-26 RX ORDER — MOXIFLOXACIN HYDROCHLORIDE TABLETS, 400 MG 400 MG/1
1 TABLET, FILM COATED ORAL
Qty: 14 | Refills: 0 | OUTPATIENT
Start: 2018-10-26 | End: 2018-11-01

## 2018-10-26 RX ORDER — MAGNESIUM SULFATE 500 MG/ML
1 VIAL (ML) INJECTION
Qty: 0 | Refills: 0 | Status: COMPLETED | OUTPATIENT
Start: 2018-10-26 | End: 2018-10-26

## 2018-10-26 RX ORDER — MAGNESIUM SULFATE 500 MG/ML
2 VIAL (ML) INJECTION ONCE
Qty: 0 | Refills: 0 | Status: DISCONTINUED | OUTPATIENT
Start: 2018-10-26 | End: 2018-10-26

## 2018-10-26 RX ORDER — SODIUM CHLORIDE 9 MG/ML
1000 INJECTION INTRAMUSCULAR; INTRAVENOUS; SUBCUTANEOUS ONCE
Qty: 0 | Refills: 0 | Status: COMPLETED | OUTPATIENT
Start: 2018-10-26 | End: 2018-10-26

## 2018-10-26 RX ORDER — CIPROFLOXACIN LACTATE 400MG/40ML
400 VIAL (ML) INTRAVENOUS ONCE
Qty: 0 | Refills: 0 | Status: COMPLETED | OUTPATIENT
Start: 2018-10-26 | End: 2018-10-26

## 2018-10-26 RX ADMIN — Medication 1 GRAM(S): at 17:50

## 2018-10-26 RX ADMIN — Medication 100 GRAM(S): at 16:53

## 2018-10-26 RX ADMIN — SODIUM CHLORIDE 1000 MILLILITER(S): 9 INJECTION INTRAMUSCULAR; INTRAVENOUS; SUBCUTANEOUS at 16:18

## 2018-10-26 RX ADMIN — Medication 200 MILLIGRAM(S): at 15:05

## 2018-10-26 RX ADMIN — Medication 1 GRAM(S): at 19:00

## 2018-10-26 RX ADMIN — SODIUM CHLORIDE 2000 MILLILITER(S): 9 INJECTION INTRAMUSCULAR; INTRAVENOUS; SUBCUTANEOUS at 14:40

## 2018-10-26 RX ADMIN — Medication 100 GRAM(S): at 17:51

## 2018-10-26 RX ADMIN — Medication 400 MILLIGRAM(S): at 16:18

## 2018-10-26 NOTE — ED PROVIDER NOTE - CARE PLAN
Principal Discharge DX:	Acute cystitis without hematuria  Assessment and plan of treatment:	Follow up with your PMD within 48-72 hrs. Show copies of your reports given to you. Take all of your medications as previously prescribed. Start Cipro 500mg 1 tab 2x/day for 7 days. Increase your fluids. Worsening, continued or ANY new concerning symptoms return to the emergency department.  Secondary Diagnosis:	Altered mental status

## 2018-10-26 NOTE — ED ADULT NURSE NOTE - NSIMPLEMENTINTERV_GEN_ALL_ED
Implemented All Fall with Harm Risk Interventions:  [place] to call system. Call bell, personal items and telephone within reach. Instruct patient to call for assistance. Room bathroom lighting operational. Non-slip footwear when patient is off stretcher. Physically safe environment: no spills, clutter or unnecessary equipment. Stretcher in lowest position, wheels locked, appropriate side rails in place. Provide visual cue, wrist band, yellow gown, etc. Monitor gait and stability. Monitor for mental status changes and reorient to person, place, and time. Review medications for side effects contributing to fall risk. Reinforce activity limits and safety measures with patient and family. Provide visual clues: red socks.

## 2018-10-26 NOTE — ED PROVIDER NOTE - PLAN OF CARE
Follow up with your PMD within 48-72 hrs. Show copies of your reports given to you. Take all of your medications as previously prescribed. Start Cipro 500mg 1 tab 2x/day for 7 days. Increase your fluids. Worsening, continued or ANY new concerning symptoms return to the emergency department.

## 2018-10-26 NOTE — ED PROVIDER NOTE - ATTENDING CONTRIBUTION TO CARE
88 y f cc altered mental status , recently removed foly 1 month ago  General:    elderly frail  Head:     NC/AT, EOMI, oral mucosa dry   Neck:     trachea midline  Lungs:     CTA b/l, no w/r/r  CVS:     S1S2, RRR, no m/g/r  Abd:     +BS, s/nt/nd, no organomegaly  Ext:    2+ radial and pedal pulses, no c/c/e  Neuro: awake follows vernal commands,  no sensory/motor deficits

## 2018-10-26 NOTE — ED ADULT NURSE NOTE - PMH
Diabetes    GERD (gastroesophageal reflux disease)    H/O: gout    HTN (hypertension)    Hyperlipemia    Incontinence    Major depressive disorder    Osteoarthritis    UTI (urinary tract infection)    Vertigo

## 2018-10-26 NOTE — ED PROVIDER NOTE - MEDICAL DECISION MAKING DETAILS
89 y/o F sent in from [facility] with h/o HTN, DM seen here 1 month ago for a UTI empirically started on macrobid presents with fever Tmax 101 this am and being "confused" upon awakening as per daughter Summer currently neurological intact and alert and oriented x 3- Sepsis work up with basic labs, lactate, CXR, UA, Urine Culture, Fluids, EKG

## 2018-10-26 NOTE — ED PROVIDER NOTE - OBJECTIVE STATEMENT
87 y/o F sent in from [facility] with h/o HTN, DM seen here 1 month ago for a UTI (OH'ed with bennett- removed) presents with fever and being "confused this am" upon awakening as per daughter Summer. Started on Macrobid empirically 5 days ago. States same sx's last month when diagnosed with UTI.  Denies cough, sore throat, abdominal pain, chest pain, shortness of breath. 87 y/o F sent in from [facility] with h/o HTN, DM seen here 1 month ago for a UTI (MI'ed with bennett- removed) presents with fever and being "confused this am" upon awakening as per daughter Summer and generalized malaise. Started on Macrobid empirically 5 days ago. States same sx's last month when diagnosed with UTI.  States oral temp of 101 this am at [facility]- given Tylenol prior to arrival. Denies cough, sore throat, abdominal pain, nausea, vomiting, diarrhea, chest pain, shortness of breath, extremity weakness, numbness, trouble speaking, trouble walking.  Answering questions appropriately.

## 2018-10-26 NOTE — ED PROVIDER NOTE - PROGRESS NOTE DETAILS
ROBB Trevino- UA negative but treated with Macrobid x 5 days- culture sent to lab. CT head no acute stroke. CXR NAPF. EKG NSR no acute findings. Mg low- IV replacement given. Pt feeling well. Shared decision making with family. Will DC home with strict return precautions. Cipro sent to pharm to treat empirically for UTI PA Tiberio- UA negative but treated with Macrobid x 5 days- culture sent to lab. WBC 12 however lactate 1.4. Trops neg. CT head no acute stroke. CXR NAPF. EKG NSR no acute findings. Mg low- IV replacement given. Pt feeling well. Shared decision making with family. Will DC home with strict return precautions. Cipro sent to pharm to treat empirically for UTI

## 2018-10-27 LAB
CULTURE RESULTS: NO GROWTH — SIGNIFICANT CHANGE UP
SPECIMEN SOURCE: SIGNIFICANT CHANGE UP

## 2018-10-31 LAB
CULTURE RESULTS: SIGNIFICANT CHANGE UP
CULTURE RESULTS: SIGNIFICANT CHANGE UP
MAG AB SER-ACNC: NEGATIVE — SIGNIFICANT CHANGE UP
SPECIMEN SOURCE: SIGNIFICANT CHANGE UP
SPECIMEN SOURCE: SIGNIFICANT CHANGE UP

## 2019-01-28 NOTE — DIETITIAN INITIAL EVALUATION ADULT. - PERTINENT MEDS FT
Daily Note     Today's date: 2019  Patient name: Ziggy Barba  : 1977  MRN: [id number]  Referring provider: Mary Brown MD  Dx:   Encounter Diagnosis     ICD-10-CM    1  Chronic left-sided low back pain with left-sided sciatica M54 42     G89 29    2  Numbness of left foot R20 8                   Subjective: Pt reports feeling "good" with the home stretching program an dhas been able to complete these exercises up to 4x/day  Pt states he has had little pain with functional activities over the weekend providing he is taking pain medication and is doing the exs  Pt continues to have numbness LLE      Objective: See treatment diary below  Slight pain over L PSIS, non-tender lumbar spine  Assessment: Tolerated treatment well and with good technique - PT only adjusting abdominal brace technique  Pt without c/o pain during exercise    Patient exhibited good technique with therapeutic exercises and would benefit from continued PT      Plan: Continue per plan of care  Progress treatment as tolerated            Precautions: Chronic LBP with L sciatica/degenerative changes;     Daily Treatment Diary     Manual             STM l/s, QL, L/s L>R; 10'            Prone press ups  With over pressure x 5'           TPR  L glute med/piriformis; x 5'                                         Exercise Diary             S/l pillow stretch for L QL x10 2mins b/l           Pelvic tilts initiated for HEP With bridge 3x10           Prone press ups x10 With overpressure x 10           Standing trunk extension x10 HEP           TA brace +ball squeeze  2x10           TA brace+ER   GTB 2x10           quadruped  Arms x 10, legs x 10; bird-dog x10 b/l           bike  L2 x 10'                                                                                                                                                                           Modalities              Supine hooklying/ p tx CP x 10
Nagi, Stanislaw stearns,Lexapro, Neurontin, Antivert, Protonix, Inderal, Zyloprim

## 2022-08-26 NOTE — PROGRESS NOTE ADULT - PROVIDER SPECIALTY LIST ADULT
Called patient and advised on the below message from Heather.  Patient was understanding and had no further questions.   
Critical Care
From: Ling Ventura  To: Heather MartinezVidyaGiulia  Sent: 8/26/2022 7:13 AM CDT  Subject: Headaches    Hi Heather,    I am seeing a provider, Dr. Patterson, through the [facility] in [place]. He completed an exam & ran some blood work & believes I have endometriosis & PCOS. I have a sonohystogram in a few weeks to confirm.     Dr. Patterson prescribed me Metformin ER for the PCOS symptoms. It is a 500 mg tablet. I will be working my way up to taking 1,500 mg/day. I started Metformin on Wednesday (8/24) and have had a headache each day after taking it. The headache is moderate and lasts the whole day. Yesterday I did have to take Naratriptan to help because it was becoming uncomfortable.    It could be nothing & just a coincidence, but I wanted to reach out to you to see if there are any sort of interactions between Metformin & Verapamil that would maybe cause the Verapamil to not work as well? Hopefully that’s not the case & it’s just a coincidence. :)    Thank you!  Caty  
Hospitalist
Infectious Disease
Please call patient and advise her I reviewed the literature and as far as I can see the only interaction between verapamil and metformin that stands out is that it could reduce the effectiveness of metformin lowering your blood sugars but that is not why you are taking it so I do not perceive that being a concern.      This may be more so coincidental or you may be experiencing a less common side effect with metformin.    I would encourage you to continue your rescue medicines as you need them.  If you continue to struggle with headaches I would encourage you to reach out to the person prescribing metformin as that is the only thing that is new and different for you associated with timing of worsening headaches.    Please let me know if she has any questions.      
Infectious Disease
Critical Care
Hospitalist

## 2023-05-03 NOTE — ED PROVIDER NOTE - PMH
RHEUMATOLOGY FOLLOW UP VISIT       1. Seropositive rheumatoid arthritis   - Diagnosed 6/2014   - RF, CCP positive   - Hand X-Ray 11/9/16- no erosions   - MRI left hand with and without contrast 11/12/2022 6 mm subenthesial cyst versus chronic erosion in the 3rd metacarpal head radially.  Very mild enhancing synovitis of the 3rd MCP joint radially but not diffusely is nonspecific.  Cannot exclude an inflammatory arthropathy.  No other significant enhancing synovitis elsewhere.  Longitudinal split tears of extensor carpi ulnaris, moderate tendinosis, and mild tenosynovitis which is likely reactive.  No other tenosynovitis.   - MRI right hand with and without contrast 11/8/2022: Chronic small 8 mm erosion versus subenthesial cyst in the 3rd metacarpal head has been present since at least 2014.  Cannot exclude an inflammatory arthropathy.  There is focal adjacent enhancement in the 3rd MCP joint radial collateral ligament which is also intermediate on STIR imaging, favored to represent scar/granulation tissue from prior injury.  Focal synovitis is possible but there is no other enhancing synovitis at  the 3rd MCP joint or elsewhere in the hand. Longitudinal tear of the extensor carpi ulnaris tendon in the ulnar groove which is also partially subluxed over the ulnar styloid suggestive nodular sub-sheath tear.  Mild adjacent peritendinitis. No other tenosynovitis. At least marked thinning of the TFCC central disc.  Cannot exclude tear in this large field-of-view exam.  Small subchondral cyst versus erosion in the 1st metacarpal head. Mild osteoarthritis the 1st CMC joint.   - Medications in the past:   · Methotrexate( 11/9/16- 6/2017 elevated LFTs)   · Leflunomide( 6/2017-8/2017 elevated LFTs)  · Enbrel ( 9/2017- eye lid rash and swelling, strep throat, thrush, vaginal yeast infection)  · Hydroxychloroquine (5/2016- 5/2019.  Stopped as her rheumatoid arthritis was in remission once Xeljanz was started.  Restarted  10/2022 and took for 2 months but patient stopped on her own as she stated it did not help)  · Actemra (1/2022-4/2022.  Did not help)  · Rituxan IV infusions: 1st Cycle: 8/4/22 and 8/18/22.   - Medications now:   · Xeljanz (8/9/2018-1/2022.  This was changed to Rituxan after she started to have frequent flareups in January 2022.  Restarted 4/2022)       H/o DVT and 4 miscarriages   - GÓMEZ negative 2/18/14   - aCLAb, LA, B2GPAb negative 2/18/14     SCREENING:   - HBsAg, HCAb negative 2/18/14, 8/31/17, 8/2/21   - Quantiferon TB gold negative 2/18/14, 8/31/17, 1/22/22   - Chest X-Ray 6/16/14 normal   - Ophthalmology exam: 2/16/17     CONTRACEPTION   - Has IUD    VACCINATIONS: refused all vaccinations  · PPCV20:   · Shingrix:  · COVID-19:       HISTORY OF THE PRESENT ILLNESS     Visit date: 05/03/23  Chief Complaint   Patient presents with   • Office Visit     Rheumatoid arthritis, seropositive     Denies any joint pain.  Morning stiffness lasts 10 minutes.    PHYSICAL EXAM     Visit Vitals  /82 (BP Location: RUE - Right upper extremity, Patient Position: Sitting, Cuff Size: Regular)   Pulse 78   Temp 97.5 °F (36.4 °C) (Temporal)   Resp 18   Ht 5' 5\" (1.651 m)   Wt 81.8 kg (180 lb 3.6 oz)   LMP 01/12/2022   SpO2 98%   BMI 29.99 kg/m²     General appearance: well nourished, no distress  Eye: clear conjunctiva and sclera  Lungs: CBS, no crackles  Cardiovascular: RRR, no murmur  Musculoskeletal:   -Hands: No tenderness, no swelling, full ROM  -Wrists: No tenderness, no swelling  -Elbows: No tenderness, no swelling no swelling  - Shoulders: No tenderness, no swelling no swelling  -Knees: No tenderness, no swelling  Skin: no rash    REVIEW OF SYSTEMS      Denies fever, SOB, cough, abdominal pain,headaches.    PAST HISTORY     Past Medical History:    Rheumatoid arthritis (CMD)                                    Genital herpes                                                Obstructive sleep apnea                                        S/P gastric sleeve procedure                    2023      Past Surgical History:    LAPAROSCOPY, CHOLECYSTECTOMY                                  GASTRIC BYPASS                                                  Comment: Sleeve procedure     SECTION, LOW TRANSVERSE                              ARTHROPLASTY                                                    Comment: foot    BREAST REDUCTION                                                Comment: 2015    MEDICATIONS     Current Outpatient Medications   Medication Sig Dispense Refill   • FLUoxetine (PROzac) 20 MG capsule Take 1 capsule by mouth daily. 90 capsule 1   • valACYclovir (VALTREX) 500 MG tablet TAKE 1 TABLET BY MOUTH TWICE DAILY 180 tablet 3   • Xeljanz 5 MG tablet TAKE ONE TABLET BY MOUTH TWICE DAILY. MAY BE TAKEN WITH OR WITHOUT FOOD. STORE AT ROOM TEMPERATURE. 180 tablet 0   • doxycycline monohydrate (MONODOX) 100 MG capsule TAKE 1 CAPSULE BY MOUTH TWICE DAILY WITH FOOD     • benzoyl peroxide 5 % external wash Apply topically daily. Apply to face     • clindamycin (CLINDAGEL) 1 % gel APPLY EXTERNALLY TO FACE IN THE MORNING     • desonide (DESOWEN) 0.05 % cream APPLY TOPICALLY TO THE AFFECTED AREA TWICE DAILY     • Fabior 0.1 % Foam      • levonorgestrel (Mirena, 52 MG,) (52 MG) 20 MCG/DAY intrauterine device to be inserted via physician via intrauterine route 1 each 0     No current facility-administered medications for this visit.     ALLERGIES:   Allergen Reactions   • Penicillins HIVES     DIAGNOSTIC STUDIES     Labs reviewed and discussed with patient.    Lab Results   Component Value Date    RESR 23 (H) 2023    RESR 19 10/17/2022    RESR 14 2022     Lab Results   Component Value Date    CRP 1.1 (H) 2023    CRP <0.3 10/17/2022    CRP 0.4 2022     Lab Results   Component Value Date    WBC 5.0 2023    WBC 6.1 10/17/2022    WBC 4.7 2022     Lab Results   Component Value Date    HGB 13.3 2023  Diabetes    HGB 15.5 10/17/2022    HGB 12.4 07/14/2022     Lab Results   Component Value Date     01/11/2023     10/17/2022     07/14/2022     Lab Results   Component Value Date    GPT 18 01/11/2023    GPT 32 10/17/2022    GPT 25 07/14/2022     Lab Results   Component Value Date    AST 18 01/11/2023    AST 18 10/17/2022    AST 18 07/14/2022     Lab Results   Component Value Date    CREATININE 0.61 01/11/2023    CREATININE 0.60 10/17/2022    CREATININE 0.73 07/14/2022     Lab Results   Component Value Date    GFRESTIMATE >90 01/11/2023    GFRESTIMATE >90 10/17/2022    GFRESTIMATE >90 07/14/2022      Lab Results   Component Value Date    CHOLESTEROL 243 (H) 01/11/2023    CHOLESTEROL 277 (H) 10/17/2022      Lab Results   Component Value Date    CALCLDL 149 (H) 01/11/2023    CALCLDL 167 (H) 10/17/2022      Lab Results   Component Value Date    HDL 79 01/11/2023    HDL 67 10/17/2022      Lab Results   Component Value Date    NONHDL 164 01/11/2023    NONHDL 210 10/17/2022      ASSESSMENT     1. Rheumatoid arthritis, seropositive.  No tenderness or swelling on exam.  Morning stiffness is minimal.  Patient also has fibromyalgia.  RAPID3 score: 6.  Low severity  2. High-risk medication use (Xeljanz, hydroxychloroquine)  3. Hypercholesterolemia    PLAN     Orders Placed This Encounter   • Lipid Panel With Reflex     ----------------------------------------------------------------------------------------------------------    · Continue Xeljanz 5 mg. Take 1 tablet twice daily    Blood tests: CBC, AST, ALT, ESR, CRP every 3 months, fasting lipid panel every 6 months to monitor for Xeljanz toxicity and rheumatoid arthritis activity.    Xeljanz may increase your chance for developing an infection, or may re-activate an old infection, including TB or fungal infections.   Because your body's ability to fight infection may be reduced, it is important for you to call you doctor at the first sign of any infection.  Stop  using Xeljanz if you have signs if infection such as fever, chills, sore throat, flu like symptoms, sores or white patches in your mouth, night sweats, stomach pain, diarrhea, weight loss, skin redness and swelling, cough or chest pain.   Xeljanz will need to be stopped until infection is resolved.   Follow up with PCP for cancer monitoring as Xeljanz is associated with increased risk of cancer (eg, lymphoma, lung cancer),  This also includes the risk for skin cancer.  Please see a dermatologist for skin evaluation for skin cancer.  Xeljanz is associated with increased risk of serious cardiovascular-related events (eg, heart attack, stroke), cancer (eg, lymphoma, lung cancer), thrombosis, and death   Do not receive a live vaccine while taking Xeljanz.  Call if any surgeries are planned.  Xeljanz may need to be stopped before surgery.    Please be aware that this is not a complete list of risks. We encourage all patients to read package inserts, information from the [employer], and other educational material about the drug that is being taken.     Follow up with PCP for assessment of cardiovascular risk factors as rheumatoid arthritis is associated with increased risk of cardiovascular disease.       The following vaccinations are recommended:  • Flu vaccine every year  • Pneumonia vaccine  • Shingrix vaccine for shingles ( 2 doses)  • COVID-19 vaccine.  Hold Xeljanz for 1 week after COVID-19 vaccine      Follow up in 6 months    __________________________________________________________    More than 40 minutes were spent today on this appointment.  This time includes reviewing all recent test results, obtaining and/or reviewing the history from other providers on care everywhere, performing a medically appropriate physical exam and evaluation, educating/counseling the patient, ordering medications and lab test, communicating with other healthcare professionals and documenting clinical information in the  EHR.    Patient education completed on disease process, etiology, prognosis and management.   Pro and cons for treatment discussed.   Medications were discussed in detail (indications, benefits, side effects, alternatives).    Medical compliance with plan discussed and risks of non-compliance reviewed.  Recommended prompt follow-up if symptoms remain uncontrolled or worsened.  Return to the clinic as clinically indicated as discussed with patient who verbalized understanding of and agreement with the plan.      CC:  Glenna Kolb MD   Diabetes    H/O: gout    HTN (hypertension)    Vertigo